# Patient Record
Sex: FEMALE | Race: WHITE | Employment: OTHER | URBAN - METROPOLITAN AREA
[De-identification: names, ages, dates, MRNs, and addresses within clinical notes are randomized per-mention and may not be internally consistent; named-entity substitution may affect disease eponyms.]

---

## 2017-01-05 ENCOUNTER — OFFICE VISIT (OUTPATIENT)
Dept: INTERNAL MEDICINE CLINIC | Facility: CLINIC | Age: 67
End: 2017-01-05

## 2017-01-05 ENCOUNTER — APPOINTMENT (OUTPATIENT)
Dept: LAB | Age: 67
End: 2017-01-05
Attending: INTERNAL MEDICINE
Payer: MEDICARE

## 2017-01-05 VITALS
DIASTOLIC BLOOD PRESSURE: 66 MMHG | RESPIRATION RATE: 16 BRPM | HEART RATE: 84 BPM | TEMPERATURE: 98 F | WEIGHT: 153 LBS | SYSTOLIC BLOOD PRESSURE: 120 MMHG | HEIGHT: 63.5 IN | BODY MASS INDEX: 26.77 KG/M2

## 2017-01-05 DIAGNOSIS — R79.89 ABNORMAL THYROID STIMULATING HORMONE (TSH) LEVEL: ICD-10-CM

## 2017-01-05 DIAGNOSIS — Z85.850 HISTORY OF THYROID CANCER: ICD-10-CM

## 2017-01-05 DIAGNOSIS — I10 ESSENTIAL HYPERTENSION: ICD-10-CM

## 2017-01-05 DIAGNOSIS — L57.0 ACTINIC KERATOSIS: ICD-10-CM

## 2017-01-05 DIAGNOSIS — Z00.01 ENCOUNTER FOR GENERAL ADULT MEDICAL EXAMINATION WITH ABNORMAL FINDINGS: Primary | ICD-10-CM

## 2017-01-05 DIAGNOSIS — E78.00 HYPERCHOLESTEROLEMIA: ICD-10-CM

## 2017-01-05 LAB — TSI SER-ACNC: 4.94 MIU/ML (ref 0.35–5.5)

## 2017-01-05 PROCEDURE — 36415 COLL VENOUS BLD VENIPUNCTURE: CPT

## 2017-01-05 PROCEDURE — G0438 PPPS, INITIAL VISIT: HCPCS | Performed by: INTERNAL MEDICINE

## 2017-01-05 PROCEDURE — 84443 ASSAY THYROID STIM HORMONE: CPT

## 2017-01-05 RX ORDER — FLUOROURACIL 50 MG/G
CREAM TOPICAL
Refills: 0 | COMMUNITY
Start: 2016-11-01 | End: 2017-11-06

## 2017-01-05 NOTE — PROGRESS NOTES
Okay name: Hardeep Bazan  /Sex/Age: [id number] 74year old female  Enc.  Date: 2017     Visit Information:  CC: Hardeep Bazan is here for the [facility] Wellness Exam.  We are happy to be caring for you,Nnacy,  and are ready to support your efforts to optim LDL 80 12/21/2016   VLDL 17 07/22/2016   TCHDLRATIO 2.71 07/22/2016   [place] 113 07/22/2016       Patient Active Problem List:     Actinic keratosis     Hypertension     Hypercholesterolemia     Dry eyes     History of thyroid cancer     Pattern dystrop Hives    OB/GYN History:  No LMP recorded.  Patient is postmenopausal.      Family History:  Family History   Problem Relation Age of Onset   • Cancer Father      melanoma  at 80   • Heart Disorder Father      CABG in 52's   • Heart Disorder Mother health state?: Good    How do you maintain positive mental well-being?: Social Interaction; Visiting Family    If you are a male age 38-65 or a female age 47-67, do you take aspirin?: Yes    Have you had any immunizations at another office such as Influenza What day of the week is this?: Correct    What month is it?: Correct    What year is it?: Correct    Recall \"Ball\": Correct    Recall \"Flag\": Correct    Recall \"Tree\": Correct      Review of Systems:     Constitutional: Negative for fever, chills a intact. No motor weakness, sensory loss. Gait normal.  Reflexes normal.   Psych: No anxiety, depression, fluctuating moods.     [facility] Labs and Tests:  EKG: Normal sinus rhythm  Hearing Test: Normal except decreased hearing in high frequencies at 25 dB but n 19.0   ----------------------------------------------------------  Small LDL-P and LDL Size are associated with CVD risk, but not   after  LDL-P is taken into account. [address]   HDL Particle Number Date: 12/21/2016   Value: 40.0  Ref Range >=30.5 umol/L Status: Final    Comment: Performed at: [facility], [address] [place] [address] Insulin Res .  The LP-IR score is a laboratory developed index that has been  associated with insulin resistance and diabetes risk and should be  used as one component of a physician's clinical assessment.  Neither  the LP-IR score nor the Ref Range 3.5-4.8 g/dL Status: Final   Sodium Date: 12/21/2016   Value: 137  Ref Range 136-144 mmol/L Status: Final   Potassium Date: 12/21/2016   Value: 4.0  Ref Range 3.6-5.1 mmol/L Status: Final   Chloride Date: 12/21/2016   Value: 101  Ref Range 101-11 Date: 12/21/2016   Value: 33.8  Ref Range 31.0-37.0 g/dL Status: Final   RDW Date: 12/21/2016   Value: 12.0  Ref Range 11.5-16.0 % Status: Final   RDW-SD Date: 12/21/2016   Value: 40.2  Ref Range 35.1-46.3 fL Status: Final   Neutrophil Absolute Prelim Date Enalapril    Health Maintenance and Patient Instructions:   Jenny Zelayak, the healthiest diet is a plant-based, vegetarian diet. It will prevent diabetes, heart disease, osteoporosis and other degenerative diseases such as arthritis.  It consists of vegetables, frui

## 2017-01-07 ENCOUNTER — HOSPITAL ENCOUNTER (OUTPATIENT)
Dept: MAMMOGRAPHY | Age: 67
Discharge: HOME | End: 2017-01-07
Attending: INTERNAL MEDICINE
Payer: MEDICARE

## 2017-01-07 DIAGNOSIS — Z12.31 ENCOUNTER FOR MAMMOGRAM TO ESTABLISH BASELINE MAMMOGRAM: ICD-10-CM

## 2017-01-07 PROCEDURE — 77063 BREAST TOMOSYNTHESIS BI: CPT

## 2017-01-07 PROCEDURE — 77067 SCR MAMMO BI INCL CAD: CPT

## 2017-01-24 ENCOUNTER — OFFICE VISIT (OUTPATIENT)
Dept: INTERNAL MEDICINE CLINIC | Facility: CLINIC | Age: 67
End: 2017-01-24

## 2017-01-24 VITALS
BODY MASS INDEX: 26.77 KG/M2 | HEIGHT: 63.5 IN | DIASTOLIC BLOOD PRESSURE: 80 MMHG | SYSTOLIC BLOOD PRESSURE: 124 MMHG | TEMPERATURE: 98 F | WEIGHT: 153 LBS | RESPIRATION RATE: 16 BRPM | HEART RATE: 84 BPM

## 2017-01-24 DIAGNOSIS — H92.01 RIGHT EAR PAIN: Primary | ICD-10-CM

## 2017-01-24 PROCEDURE — 99212 OFFICE O/P EST SF 10 MIN: CPT | Performed by: INTERNAL MEDICINE

## 2017-01-24 NOTE — PROGRESS NOTES
Patient presents with:  Ear Pain: R ear pain x 1 day, flying tomorrow      HPI: Lorre Gowers is here to have her right ear checked before flying to [facility] tomorrow. She had a pressure type pain in it twice in the last day. No URI sx, no tooth or tongue pain.  Took metastatic melanoma          Physical Exam:   /80 mmHg  Pulse 84  Temp(Src) 98.1 °F (36.7 °C) (Oral)  Resp 16  Ht 63.5\"  Wt 153 lb  BMI 26.67 kg/m2  Alert, in no distress  HEENT: R TM pink without serous otitis, inflammation; L TM pink, without sero

## 2017-05-30 ENCOUNTER — OFFICE VISIT (OUTPATIENT)
Dept: INTERNAL MEDICINE CLINIC | Facility: CLINIC | Age: 67
End: 2017-05-30

## 2017-05-30 VITALS
RESPIRATION RATE: 16 BRPM | HEART RATE: 108 BPM | HEIGHT: 63 IN | SYSTOLIC BLOOD PRESSURE: 118 MMHG | BODY MASS INDEX: 27.46 KG/M2 | TEMPERATURE: 98 F | DIASTOLIC BLOOD PRESSURE: 76 MMHG | WEIGHT: 155 LBS

## 2017-05-30 DIAGNOSIS — I10 ESSENTIAL HYPERTENSION: ICD-10-CM

## 2017-05-30 DIAGNOSIS — Z85.850 HISTORY OF THYROID CANCER: ICD-10-CM

## 2017-05-30 DIAGNOSIS — E78.00 HYPERCHOLESTEROLEMIA: ICD-10-CM

## 2017-05-30 DIAGNOSIS — R73.02 GLUCOSE INTOLERANCE (IMPAIRED GLUCOSE TOLERANCE): Primary | ICD-10-CM

## 2017-05-30 DIAGNOSIS — E03.8 OTHER SPECIFIED HYPOTHYROIDISM: ICD-10-CM

## 2017-05-30 DIAGNOSIS — Z78.0 POST-MENOPAUSAL: ICD-10-CM

## 2017-05-30 PROBLEM — E03.9 HYPOTHYROIDISM: Status: ACTIVE | Noted: 2017-05-30

## 2017-05-30 PROCEDURE — 99214 OFFICE O/P EST MOD 30 MIN: CPT | Performed by: INTERNAL MEDICINE

## 2017-05-30 RX ORDER — SIMVASTATIN 10 MG
TABLET ORAL
Qty: 90 TABLET | Refills: 1 | Status: SHIPPED | OUTPATIENT
Start: 2017-05-30 | End: 2018-01-17

## 2017-05-30 RX ORDER — ENALAPRIL MALEATE 20 MG/1
TABLET ORAL
Qty: 90 TABLET | Refills: 1 | Status: SHIPPED | OUTPATIENT
Start: 2017-05-30 | End: 2018-01-17

## 2017-05-30 NOTE — PROGRESS NOTES
Joby Simon is a 74year old female.   Patient presents with:  Hypertension  High Cholesterol  Overweight  Medication Follow-Up      HPI:     Patient here for establishing care and going over her chronic problems, was with Dr. Seth Dakin for 30+ years  H/o thyr History   Diagnosis Date   • Herpes zoster    • High blood pressure    • High cholesterol    • Unspecified disorder of thyroid       Social History:    Smoking Status: Never Smoker                      Smokeless Status: Never Used                        Al rpt labs now  Post-menopausal- last dexa 2014, no h/o fractures.  Discussed adequate calcium and vit d, check dexa this year  History of thyroid cancer- s/p surgery, on levothyroxine that is managed by Endo, recently increased to 137mcg daily, pt would like

## 2017-09-05 ENCOUNTER — LAB ENCOUNTER (OUTPATIENT)
Dept: LAB | Age: 67
End: 2017-09-05
Attending: INTERNAL MEDICINE
Payer: MEDICARE

## 2017-09-05 DIAGNOSIS — R73.02 GLUCOSE INTOLERANCE (IMPAIRED GLUCOSE TOLERANCE): ICD-10-CM

## 2017-09-05 DIAGNOSIS — E03.8 OTHER SPECIFIED HYPOTHYROIDISM: ICD-10-CM

## 2017-09-05 DIAGNOSIS — I10 ESSENTIAL HYPERTENSION: ICD-10-CM

## 2017-09-05 DIAGNOSIS — E78.00 HYPERCHOLESTEROLEMIA: ICD-10-CM

## 2017-09-05 DIAGNOSIS — Z85.850 HISTORY OF THYROID CANCER: ICD-10-CM

## 2017-09-05 LAB
ALBUMIN SERPL-MCNC: 3.7 G/DL (ref 3.5–4.8)
ALP LIVER SERPL-CCNC: 52 U/L (ref 55–142)
ALT SERPL-CCNC: 41 U/L (ref 14–54)
AST SERPL-CCNC: 25 U/L (ref 15–41)
BILIRUB SERPL-MCNC: 0.6 MG/DL (ref 0.1–2)
BUN BLD-MCNC: 17 MG/DL (ref 8–20)
CALCIUM BLD-MCNC: 9.2 MG/DL (ref 8.3–10.3)
CHLORIDE: 103 MMOL/L (ref 101–111)
CHOLEST SMN-MCNC: 123 MG/DL (ref ?–200)
CO2: 28 MMOL/L (ref 22–32)
CREAT BLD-MCNC: 0.91 MG/DL (ref 0.55–1.02)
EST. AVERAGE GLUCOSE BLD GHB EST-MCNC: 123 MG/DL (ref 68–126)
FREE T4: 1.6 NG/DL (ref 0.9–1.8)
GLUCOSE BLD-MCNC: 88 MG/DL (ref 70–99)
HBA1C MFR BLD HPLC: 5.9 % (ref ?–5.7)
HDLC SERPL-MCNC: 63 MG/DL (ref 45–?)
HDLC SERPL: 1.95 {RATIO} (ref ?–4.44)
LDLC SERPL CALC-MCNC: 46 MG/DL (ref ?–130)
LDLC SERPL-MCNC: 14 MG/DL (ref 5–40)
M PROTEIN MFR SERPL ELPH: 7 G/DL (ref 6.1–8.3)
NONHDLC SERPL-MCNC: 60 MG/DL (ref ?–130)
POTASSIUM SERPL-SCNC: 4.7 MMOL/L (ref 3.6–5.1)
SODIUM SERPL-SCNC: 141 MMOL/L (ref 136–144)
TRIGLYCERIDES: 71 MG/DL (ref ?–150)
TSI SER-ACNC: 0.41 MIU/ML (ref 0.35–5.5)

## 2017-09-05 PROCEDURE — 80053 COMPREHEN METABOLIC PANEL: CPT

## 2017-09-05 PROCEDURE — 83036 HEMOGLOBIN GLYCOSYLATED A1C: CPT

## 2017-09-05 PROCEDURE — 84443 ASSAY THYROID STIM HORMONE: CPT

## 2017-09-05 PROCEDURE — 84439 ASSAY OF FREE THYROXINE: CPT

## 2017-09-05 PROCEDURE — 80061 LIPID PANEL: CPT

## 2017-09-19 ENCOUNTER — HOSPITAL ENCOUNTER (OUTPATIENT)
Dept: BONE DENSITY | Age: 67
Discharge: HOME | End: 2017-09-19
Attending: INTERNAL MEDICINE
Payer: MEDICARE

## 2017-09-19 DIAGNOSIS — Z78.0 POST-MENOPAUSAL: ICD-10-CM

## 2017-09-19 PROCEDURE — 77080 DXA BONE DENSITY AXIAL: CPT | Performed by: INTERNAL MEDICINE

## 2017-09-26 ENCOUNTER — OFFICE VISIT (OUTPATIENT)
Dept: INTERNAL MEDICINE CLINIC | Facility: CLINIC | Age: 67
End: 2017-09-26

## 2017-09-26 ENCOUNTER — TELEPHONE (OUTPATIENT)
Dept: INTERNAL MEDICINE CLINIC | Facility: CLINIC | Age: 67
End: 2017-09-26

## 2017-09-26 VITALS
BODY MASS INDEX: 25.87 KG/M2 | SYSTOLIC BLOOD PRESSURE: 128 MMHG | RESPIRATION RATE: 16 BRPM | HEART RATE: 99 BPM | HEIGHT: 63 IN | DIASTOLIC BLOOD PRESSURE: 70 MMHG | TEMPERATURE: 99 F | WEIGHT: 146 LBS

## 2017-09-26 DIAGNOSIS — Z85.850 HISTORY OF THYROID CANCER: ICD-10-CM

## 2017-09-26 DIAGNOSIS — E03.8 OTHER SPECIFIED HYPOTHYROIDISM: ICD-10-CM

## 2017-09-26 DIAGNOSIS — J06.9 VIRAL URI: ICD-10-CM

## 2017-09-26 DIAGNOSIS — Z23 NEED FOR PNEUMOCOCCAL VACCINATION: ICD-10-CM

## 2017-09-26 DIAGNOSIS — E78.00 HYPERCHOLESTEROLEMIA: ICD-10-CM

## 2017-09-26 DIAGNOSIS — R73.02 GLUCOSE INTOLERANCE (IMPAIRED GLUCOSE TOLERANCE): Primary | ICD-10-CM

## 2017-09-26 DIAGNOSIS — Z12.11 ENCOUNTER FOR SCREENING COLONOSCOPY: ICD-10-CM

## 2017-09-26 DIAGNOSIS — I10 ESSENTIAL HYPERTENSION: ICD-10-CM

## 2017-09-26 PROCEDURE — 90732 PPSV23 VACC 2 YRS+ SUBQ/IM: CPT | Performed by: INTERNAL MEDICINE

## 2017-09-26 PROCEDURE — G0009 ADMIN PNEUMOCOCCAL VACCINE: HCPCS | Performed by: INTERNAL MEDICINE

## 2017-09-26 PROCEDURE — 99214 OFFICE O/P EST MOD 30 MIN: CPT | Performed by: INTERNAL MEDICINE

## 2017-09-26 NOTE — PROGRESS NOTES
Perri Hartman is a 69year old female.   Patient presents with:  Abnormal Result (metabolic, cardiac)  Thyroid Problem  Cholesterol  Vaccinations  URI      HPI:     Patient here for f/u-  Glucose intolerance- hgba1c went up slightly, pt watching diet, weigh TABS 1 TABLET DAILY Disp:  Rfl:    FISH OIL 3 capsules daily Disp:  Rfl:       Past Medical History:   Diagnosis Date   • Herpes zoster    • High blood pressure    • High cholesterol    • Unspecified disorder of thyroid       Social History:  Smoking statu tolerance) - continue healthy diet and regular exercise, check hgba1c in 3 months  Essential hypertension - controlled, cpm  Hypercholesterolemia- controlled, cpm  Need for pneumococcal vaccination- pneumovax given today  History of thyroid cancer- f/u End

## 2017-11-06 ENCOUNTER — OFFICE VISIT (OUTPATIENT)
Dept: INTERNAL MEDICINE CLINIC | Facility: CLINIC | Age: 67
End: 2017-11-06

## 2017-11-06 VITALS
DIASTOLIC BLOOD PRESSURE: 68 MMHG | WEIGHT: 138 LBS | BODY MASS INDEX: 24.45 KG/M2 | HEART RATE: 85 BPM | OXYGEN SATURATION: 99 % | RESPIRATION RATE: 17 BRPM | SYSTOLIC BLOOD PRESSURE: 120 MMHG | HEIGHT: 63 IN

## 2017-11-06 DIAGNOSIS — H92.02 LEFT EAR PAIN: Primary | ICD-10-CM

## 2017-11-06 DIAGNOSIS — J06.9 ACUTE URI: ICD-10-CM

## 2017-11-06 PROCEDURE — 99213 OFFICE O/P EST LOW 20 MIN: CPT | Performed by: NURSE PRACTITIONER

## 2017-11-06 PROCEDURE — 87880 STREP A ASSAY W/OPTIC: CPT | Performed by: NURSE PRACTITIONER

## 2017-11-06 RX ORDER — CODEINE PHOSPHATE AND GUAIFENESIN 10; 100 MG/5ML; MG/5ML
5 SOLUTION ORAL NIGHTLY PRN
Qty: 118 ML | Refills: 0 | Status: SHIPPED | OUTPATIENT
Start: 2017-11-06 | End: 2017-11-14

## 2017-11-06 RX ORDER — CEFUROXIME AXETIL 500 MG/1
500 TABLET ORAL 2 TIMES DAILY
Qty: 14 TABLET | Refills: 0 | Status: SHIPPED | OUTPATIENT
Start: 2017-11-06 | End: 2017-11-14

## 2017-11-06 NOTE — PATIENT INSTRUCTIONS
Gargle with warm salt water solution 3-5 times daily. Dissolve 1/2 teaspoon salt in half cup of warm tap water. Gargle and spit.      Try a premixed saline nasal spray, available over the counter, such as Ocean Nasal Spray, 4 times daily (

## 2017-11-06 NOTE — PROGRESS NOTES
Patient presents with:  Ear Pain: left side , has been taking advil every 4 hrs   Sore Throat: has been taking certirizine and pseudoephedrine since sat       HPI:  Presents with approx 4-5 day history of left ear pain, sinus congestion, sore throat, cough FIBERCON OR QD Disp:  Rfl:    MULTI-VITAMIN/MINERALS OR TABS 1 TABLET DAILY Disp:  Rfl:    FISH OIL 3 capsules daily Disp:  Rfl:        Physical Exam  /68   Pulse 85   Resp 17   Ht 63\"   Wt 138 lb   SpO2 99%   BMI 24.45 kg/m²   Constitutional:  No  Also, may use Ocean Nasal spray during the day while at work, school, in between full sinus rinses. Still try to do full nasal wash at least 3 times daily (morning before work, after work and before bed). Take all antibiotics as prescribed.  Do not sto

## 2017-11-14 ENCOUNTER — OFFICE VISIT (OUTPATIENT)
Dept: INTERNAL MEDICINE CLINIC | Facility: CLINIC | Age: 67
End: 2017-11-14

## 2017-11-14 VITALS
DIASTOLIC BLOOD PRESSURE: 72 MMHG | BODY MASS INDEX: 25 KG/M2 | SYSTOLIC BLOOD PRESSURE: 120 MMHG | TEMPERATURE: 99 F | HEART RATE: 60 BPM | WEIGHT: 143 LBS | RESPIRATION RATE: 16 BRPM

## 2017-11-14 DIAGNOSIS — H91.92 HEARING LOSS OF LEFT EAR, UNSPECIFIED HEARING LOSS TYPE: Primary | ICD-10-CM

## 2017-11-14 PROCEDURE — 99213 OFFICE O/P EST LOW 20 MIN: CPT | Performed by: NURSE PRACTITIONER

## 2017-11-14 RX ORDER — LEVOTHYROXINE SODIUM 137 UG/1
137 TABLET ORAL
COMMUNITY
End: 2019-08-12

## 2017-11-14 NOTE — PROGRESS NOTES
Patient presents with: Follow - Up: left ear pain, voice is not back per patient thinks it might have gone to right ear, still coughing up stuff ROOM 1       HPI:  Presents for follow up of recent otitis media with left ear pain and hearing loss.  Elena tan Rfl:    FISH OIL 3 capsules daily Disp:  Rfl:        Physical Exam  /72 (BP Location: Right arm, Patient Position: Sitting, Cuff Size: adult)   Pulse 60   Temp 98.8 °F (37.1 °C) (Oral)   Resp 16   Wt 143 lb   BMI 25.33 kg/m²   Constitutional:  No dis

## 2017-12-12 ENCOUNTER — TELEPHONE (OUTPATIENT)
Dept: INTERNAL MEDICINE CLINIC | Facility: CLINIC | Age: 67
End: 2017-12-12

## 2017-12-12 DIAGNOSIS — Z12.39 SCREENING FOR BREAST CANCER: Primary | ICD-10-CM

## 2017-12-12 NOTE — TELEPHONE ENCOUNTER
Please advise : [facility] RADHA 2D+3D SCRN BETH W/CAD BILAT  Or DIGITAL SCREENING BETH W/ CAD    Notes Recorded by Jeramy Henson MD on 1/9/2017 at 1:00 PM   Phil Messina, your mammogram is normal.

## 2017-12-20 ENCOUNTER — HOSPITAL ENCOUNTER (OUTPATIENT)
Dept: CT IMAGING | Facility: HOSPITAL | Age: 67
Discharge: HOME | End: 2017-12-20
Attending: INTERNAL MEDICINE

## 2017-12-20 DIAGNOSIS — Z13.6 SCREENING FOR HEART DISEASE: ICD-10-CM

## 2017-12-24 ENCOUNTER — TELEPHONE (OUTPATIENT)
Dept: INTERNAL MEDICINE CLINIC | Facility: CLINIC | Age: 67
End: 2017-12-24

## 2017-12-24 DIAGNOSIS — R93.1 ABNORMAL SCREENING CARDIAC CT: ICD-10-CM

## 2017-12-24 DIAGNOSIS — E78.5 DYSLIPIDEMIA: Primary | ICD-10-CM

## 2017-12-29 ENCOUNTER — OFFICE VISIT (OUTPATIENT)
Dept: FAMILY MEDICINE CLINIC | Facility: CLINIC | Age: 67
End: 2017-12-29

## 2017-12-29 VITALS
RESPIRATION RATE: 18 BRPM | WEIGHT: 143 LBS | TEMPERATURE: 98 F | DIASTOLIC BLOOD PRESSURE: 82 MMHG | SYSTOLIC BLOOD PRESSURE: 138 MMHG | BODY MASS INDEX: 25 KG/M2 | OXYGEN SATURATION: 100 % | HEART RATE: 80 BPM

## 2017-12-29 DIAGNOSIS — J20.9 BRONCHITIS WITH BRONCHOSPASM: Primary | ICD-10-CM

## 2017-12-29 PROCEDURE — 99213 OFFICE O/P EST LOW 20 MIN: CPT | Performed by: NURSE PRACTITIONER

## 2017-12-29 RX ORDER — ALBUTEROL SULFATE 90 UG/1
AEROSOL, METERED RESPIRATORY (INHALATION)
Qty: 1 INHALER | Refills: 0 | Status: SHIPPED | OUTPATIENT
Start: 2017-12-29 | End: 2018-01-30 | Stop reason: ALTCHOICE

## 2017-12-29 RX ORDER — METHYLPREDNISOLONE 4 MG/1
TABLET ORAL
Qty: 1 KIT | Refills: 0 | Status: SHIPPED | OUTPATIENT
Start: 2017-12-29 | End: 2018-01-30 | Stop reason: ALTCHOICE

## 2017-12-29 NOTE — PROGRESS NOTES
CHIEF COMPLAINT:   Patient presents with:  Cough: hears wheeze for 3 days        HPI:   Silke Mitchell is a 69year old female who presents for cough for  3  days. Cough started gradually and is described as tight and deep.  Patient denies history of bronch Diagnosis Date   • Herpes zoster    • High blood pressure    • High cholesterol    • Unspecified disorder of thyroid       Social History:  Smoking status: Never Smoker                                                              Smokeless tobacco: Never U Kavita Stokes is a 69year old female who presents with: Wheezing and bronchospasm during cough. Wheeze does not clear with cough, denies any chills or temperature elevation,is currently under ENTs' care for left TM rupture.  Will treat bronchitis and bronc Lungs with bronchitis  Bronchitis often occurs with a cold or the flu virus. The airways become inflamed (red and swollen). There is a deep hacking cough from the extra mucus.  Other symptoms may include:  · Wheezing  · Chest discomfort  · Shortness of bonifacio · Drink a lot of water and juice. They can soothe the throat and may help thin mucus. · Sit up or use extra pillows when in bed. This helps to lessen coughing and congestion. · Ask your provider about using medicine.  Ask about using cough medicine, pain Call your healthcare provider if:  · Symptoms worsen, or you have new symptoms  · Breathing problems worsen or  become severe  · Symptoms don’t get better within a week, or within 3 days of taking antibiotics   Date Last Reviewed: 2/1/2017 © 2000-2017 The

## 2017-12-29 NOTE — PATIENT INSTRUCTIONS
What Is Acute Bronchitis? Acute bronchitis is when the airways in your lungs (bronchial tubes) become red and swollen (inflamed). It is usually caused by a viral infection. But it can also occur because of a bacteria or allergen.  Symptoms include a coug · A chest X-ray. This is done if your healthcare provider thinks you have pneumonia. · Tests to check for an underlying condition. Other tests may be done to check for things such as allergies, asthma, or COPD (chronic obstructive pulmonary disease).  You · Take the medicines as directed. For instance, some medicines should be taken with food. · Ask about side effects. Ask your provider or pharmacist what side effects are common, and what to do about them.   Follow-up care  You should see your provider joy

## 2018-01-05 ENCOUNTER — TELEPHONE (OUTPATIENT)
Dept: INTERNAL MEDICINE CLINIC | Facility: CLINIC | Age: 68
End: 2018-01-05

## 2018-01-09 ENCOUNTER — HOSPITAL ENCOUNTER (OUTPATIENT)
Dept: MAMMOGRAPHY | Age: 68
Discharge: HOME | End: 2018-01-09
Attending: NURSE PRACTITIONER
Payer: MEDICARE

## 2018-01-09 DIAGNOSIS — Z12.39 SCREENING FOR BREAST CANCER: ICD-10-CM

## 2018-01-09 PROCEDURE — 77067 SCR MAMMO BI INCL CAD: CPT | Performed by: NURSE PRACTITIONER

## 2018-01-09 PROCEDURE — 77063 BREAST TOMOSYNTHESIS BI: CPT | Performed by: NURSE PRACTITIONER

## 2018-01-10 ENCOUNTER — HOSPITAL ENCOUNTER (OUTPATIENT)
Dept: CARDIOLOGY CLINIC | Facility: HOSPITAL | Age: 68
Discharge: HOME | End: 2018-01-10
Attending: INTERNAL MEDICINE

## 2018-01-10 DIAGNOSIS — Z13.9 SPECIAL SCREENING: ICD-10-CM

## 2018-01-17 RX ORDER — SIMVASTATIN 10 MG
TABLET ORAL
Qty: 90 TABLET | Refills: 0 | Status: SHIPPED | OUTPATIENT
Start: 2018-01-17 | End: 2018-01-30

## 2018-01-17 RX ORDER — ENALAPRIL MALEATE 20 MG/1
TABLET ORAL
Qty: 90 TABLET | Refills: 0 | Status: SHIPPED | OUTPATIENT
Start: 2018-01-17 | End: 2018-01-30

## 2018-01-22 ENCOUNTER — LAB ENCOUNTER (OUTPATIENT)
Dept: LAB | Age: 68
End: 2018-01-22
Attending: INTERNAL MEDICINE
Payer: MEDICARE

## 2018-01-22 DIAGNOSIS — E03.8 OTHER SPECIFIED HYPOTHYROIDISM: ICD-10-CM

## 2018-01-22 DIAGNOSIS — E78.00 HYPERCHOLESTEROLEMIA: ICD-10-CM

## 2018-01-22 DIAGNOSIS — R73.02 GLUCOSE INTOLERANCE (IMPAIRED GLUCOSE TOLERANCE): ICD-10-CM

## 2018-01-22 DIAGNOSIS — I10 ESSENTIAL HYPERTENSION: ICD-10-CM

## 2018-01-22 LAB
ALBUMIN SERPL-MCNC: 3.8 G/DL (ref 3.5–4.8)
ALP LIVER SERPL-CCNC: 58 U/L (ref 55–142)
ALT SERPL-CCNC: 45 U/L (ref 14–54)
AST SERPL-CCNC: 38 U/L (ref 15–41)
BASOPHILS # BLD AUTO: 0.07 X10(3) UL (ref 0–0.1)
BASOPHILS NFR BLD AUTO: 0.7 %
BILIRUB SERPL-MCNC: 0.6 MG/DL (ref 0.1–2)
BUN BLD-MCNC: 21 MG/DL (ref 8–20)
CALCIUM BLD-MCNC: 8.5 MG/DL (ref 8.3–10.3)
CHLORIDE: 106 MMOL/L (ref 101–111)
CHOLEST SMN-MCNC: 171 MG/DL (ref ?–200)
CO2: 28 MMOL/L (ref 22–32)
CREAT BLD-MCNC: 0.93 MG/DL (ref 0.55–1.02)
EOSINOPHIL # BLD AUTO: 0.09 X10(3) UL (ref 0–0.3)
EOSINOPHIL NFR BLD AUTO: 0.9 %
ERYTHROCYTE [DISTWIDTH] IN BLOOD BY AUTOMATED COUNT: 12.3 % (ref 11.5–16)
EST. AVERAGE GLUCOSE BLD GHB EST-MCNC: 123 MG/DL (ref 68–126)
FREE T4: 1.2 NG/DL (ref 0.9–1.8)
GLUCOSE BLD-MCNC: 88 MG/DL (ref 70–99)
HBA1C MFR BLD HPLC: 5.9 % (ref ?–5.7)
HCT VFR BLD AUTO: 47.1 % (ref 34–50)
HDLC SERPL-MCNC: 65 MG/DL (ref 45–?)
HDLC SERPL: 2.63 {RATIO} (ref ?–4.44)
HGB BLD-MCNC: 15 G/DL (ref 12–16)
IMMATURE GRANULOCYTE COUNT: 0.03 X10(3) UL (ref 0–1)
IMMATURE GRANULOCYTE RATIO %: 0.3 %
LDLC SERPL CALC-MCNC: 87 MG/DL (ref ?–130)
LYMPHOCYTES # BLD AUTO: 2.15 X10(3) UL (ref 0.9–4)
LYMPHOCYTES NFR BLD AUTO: 22.7 %
M PROTEIN MFR SERPL ELPH: 7.5 G/DL (ref 6.1–8.3)
MCH RBC QN AUTO: 29.5 PG (ref 27–33.2)
MCHC RBC AUTO-ENTMCNC: 31.8 G/DL (ref 31–37)
MCV RBC AUTO: 92.7 FL (ref 81–100)
MONOCYTES # BLD AUTO: 0.52 X10(3) UL (ref 0.1–0.6)
MONOCYTES NFR BLD AUTO: 5.5 %
NEUTROPHIL ABS PRELIM: 6.63 X10 (3) UL (ref 1.3–6.7)
NEUTROPHILS # BLD AUTO: 6.63 X10(3) UL (ref 1.3–6.7)
NEUTROPHILS NFR BLD AUTO: 69.9 %
NONHDLC SERPL-MCNC: 106 MG/DL (ref ?–130)
PLATELET # BLD AUTO: 264 10(3)UL (ref 150–450)
POTASSIUM SERPL-SCNC: 4.5 MMOL/L (ref 3.6–5.1)
RBC # BLD AUTO: 5.08 X10(6)UL (ref 3.8–5.1)
RED CELL DISTRIBUTION WIDTH-SD: 42.2 FL (ref 35.1–46.3)
SODIUM SERPL-SCNC: 140 MMOL/L (ref 136–144)
TRIGL SERPL-MCNC: 96 MG/DL (ref ?–150)
TSI SER-ACNC: 3.42 MIU/ML (ref 0.35–5.5)
VLDLC SERPL CALC-MCNC: 19 MG/DL (ref 5–40)
WBC # BLD AUTO: 9.5 X10(3) UL (ref 4–13)

## 2018-01-22 PROCEDURE — 84443 ASSAY THYROID STIM HORMONE: CPT

## 2018-01-22 PROCEDURE — 83036 HEMOGLOBIN GLYCOSYLATED A1C: CPT

## 2018-01-22 PROCEDURE — 84439 ASSAY OF FREE THYROXINE: CPT

## 2018-01-22 PROCEDURE — 80053 COMPREHEN METABOLIC PANEL: CPT

## 2018-01-22 PROCEDURE — 85025 COMPLETE CBC W/AUTO DIFF WBC: CPT

## 2018-01-22 PROCEDURE — 80061 LIPID PANEL: CPT

## 2018-01-24 RX ORDER — SIMVASTATIN 10 MG
TABLET ORAL
Qty: 90 TABLET | Refills: 0 | Status: SHIPPED | OUTPATIENT
Start: 2018-01-24 | End: 2018-01-30

## 2018-01-30 ENCOUNTER — OFFICE VISIT (OUTPATIENT)
Dept: INTERNAL MEDICINE CLINIC | Facility: CLINIC | Age: 68
End: 2018-01-30

## 2018-01-30 VITALS
TEMPERATURE: 98 F | DIASTOLIC BLOOD PRESSURE: 78 MMHG | HEIGHT: 63 IN | RESPIRATION RATE: 16 BRPM | HEART RATE: 84 BPM | BODY MASS INDEX: 24.98 KG/M2 | SYSTOLIC BLOOD PRESSURE: 126 MMHG | WEIGHT: 141 LBS

## 2018-01-30 DIAGNOSIS — H35.54 PATTERN DYSTROPHY OF MACULA: ICD-10-CM

## 2018-01-30 DIAGNOSIS — E74.39 GLUCOSE INTOLERANCE: ICD-10-CM

## 2018-01-30 DIAGNOSIS — Z85.850 HISTORY OF THYROID CANCER: ICD-10-CM

## 2018-01-30 DIAGNOSIS — E78.00 HYPERCHOLESTEROLEMIA: ICD-10-CM

## 2018-01-30 DIAGNOSIS — I10 ESSENTIAL HYPERTENSION: ICD-10-CM

## 2018-01-30 DIAGNOSIS — Z00.00 WELLNESS EXAMINATION: Primary | ICD-10-CM

## 2018-01-30 PROCEDURE — G0439 PPPS, SUBSEQ VISIT: HCPCS | Performed by: INTERNAL MEDICINE

## 2018-01-30 RX ORDER — ENALAPRIL MALEATE 20 MG/1
20 TABLET ORAL
Qty: 90 TABLET | Refills: 3 | Status: SHIPPED | OUTPATIENT
Start: 2018-01-30 | End: 2019-03-11

## 2018-01-30 RX ORDER — SIMVASTATIN 10 MG
10 TABLET ORAL
Qty: 90 TABLET | Refills: 3 | Status: SHIPPED | OUTPATIENT
Start: 2018-01-30 | End: 2019-04-02

## 2018-01-30 NOTE — PROGRESS NOTES
Holly Rooney is a 69year old female who presents for a Medicare Annual Wellness visit.     Left ear finally back to normal, can hear again, s/p complicated otitis media/sinusitis  Glucose intolerance-  pt watching diet and doing the bike, HGBA1c 5.9  HTN/ lb  11/06/17 : 138 lb  09/26/17 : 146 lb  05/30/17 : 155 lb    Body mass index is 24.98 kg/m².       Lab Results  Component Value Date   GLU 88 01/22/2018   GLU 88 09/05/2017   GLU 87 12/21/2016       Lab Results  Component Value Date   CHOLEST 171 01/22/20 without help    Preparing your meals: Able without help    Managing money/bills: Able without help    Taking medications as prescribed: Able without help    Are you able to afford your medications?: Yes    Hearing Problems?: No     Functional Status     He Colorectal Cancer Screening      Colonoscopy Screen every 10 years Colonoscopy,10 Years due on 03/05/2025 Update Health Maintenance if applicable    Flex Sigmoidoscopy Screen every 5 years No results found for this or any previous visit.  No flowsheet lawson No flowsheet data found. Creatinine  Annually Creatinine (mg/dL)   Date Value   01/22/2018 0.93    No flowsheet data found. Digoxin Serum Conc  Annually No results found for: DIGOXIN No flowsheet data found.     Diabetes      HgbA1C  Annually HgbA1C ( ENDOSCOPY  : HYSTERECTOMY      Comment: + oophorectomy  No date:       Comment: 5 children  2000: THYROIDECTOMY   Family History   Problem Relation Age of Onset   • Cancer Father      melanoma  at 80   • Heart Disorder Father      CABG in 52's chest tenderness  BREAST: no masses, no discharge or no axillary lymphadenopathy   LUNGS: clear to auscultation  CARDIO: RRR without murmur  GI: good BS's, no masses, HSM or tenderness  : deferred  RECTAL: deferred  MUSCULOSKELETAL: back is not tender, F

## 2018-04-24 PROBLEM — I25.10 CORONARY ARTERY DISEASE INVOLVING NATIVE CORONARY ARTERY WITHOUT ANGINA PECTORIS: Status: ACTIVE | Noted: 2018-04-24

## 2018-07-24 ENCOUNTER — APPOINTMENT (OUTPATIENT)
Dept: OTHER | Facility: HOSPITAL | Age: 68
End: 2018-07-24
Attending: PREVENTIVE MEDICINE

## 2019-01-29 ENCOUNTER — TELEPHONE (OUTPATIENT)
Dept: INTERNAL MEDICINE CLINIC | Facility: CLINIC | Age: 69
End: 2019-01-29

## 2019-01-29 DIAGNOSIS — Z12.31 ENCOUNTER FOR SCREENING MAMMOGRAM FOR MALIGNANT NEOPLASM OF BREAST: ICD-10-CM

## 2019-01-29 DIAGNOSIS — I10 ESSENTIAL HYPERTENSION: ICD-10-CM

## 2019-01-29 DIAGNOSIS — E03.8 OTHER SPECIFIED HYPOTHYROIDISM: ICD-10-CM

## 2019-01-29 DIAGNOSIS — Z00.00 ROUTINE GENERAL MEDICAL EXAMINATION AT A HEALTH CARE FACILITY: Primary | ICD-10-CM

## 2019-01-29 DIAGNOSIS — E78.00 HYPERCHOLESTEROLEMIA: ICD-10-CM

## 2019-01-29 NOTE — TELEPHONE ENCOUNTER
Future Appointments   Date Time Provider Shane Nieves   4/2/2019 11:00 AM Sherin Mobley MD [facility]       Pt has   And would like order for BW and John.  Pt aware to fast.

## 2019-02-13 ENCOUNTER — PATIENT OUTREACH (OUTPATIENT)
Dept: CASE MANAGEMENT | Age: 69
End: 2019-02-13

## 2019-02-14 ENCOUNTER — TELEPHONE (OUTPATIENT)
Dept: CASE MANAGEMENT | Age: 69
End: 2019-02-14

## 2019-02-14 ENCOUNTER — TELEPHONE (OUTPATIENT)
Dept: INTERNAL MEDICINE CLINIC | Facility: CLINIC | Age: 69
End: 2019-02-14

## 2019-02-14 ENCOUNTER — PATIENT OUTREACH (OUTPATIENT)
Dept: CASE MANAGEMENT | Age: 69
End: 2019-02-14

## 2019-02-14 NOTE — TELEPHONE ENCOUNTER
Notes Recorded by Jacinda Sanchez NP on 1/9/2018 at 1:00 PM [facility] RADHA 2D+3D SCREENING BILAT  Results reviewed. Patient will be notified by radiology. CPE labs ordered per protocol.

## 2019-02-14 NOTE — TELEPHONE ENCOUNTER
Pt enrolled in CCM program today. See TE from 1/29/19-   Pt requesting order for mammogram . Pt states that she would like to complete her mammogram before her upcoming appointment with TB. Thank you!   Future Appointments   Date Time Provider Shahab Rivero

## 2019-02-18 ENCOUNTER — PATIENT OUTREACH (OUTPATIENT)
Dept: CASE MANAGEMENT | Age: 69
End: 2019-02-18

## 2019-02-18 NOTE — PROGRESS NOTES
Called for CCM ASSESSMENT.  Mela Nicholas picked up the phone (ok per hippa) . He states that tomorrow morning would be a better time to call back, pt is not home. Will call tomorrow for CCM ASSESSMENT.

## 2019-02-19 ENCOUNTER — PATIENT OUTREACH (OUTPATIENT)
Dept: CASE MANAGEMENT | Age: 69
End: 2019-02-19

## 2019-03-05 ENCOUNTER — PATIENT OUTREACH (OUTPATIENT)
Dept: CASE MANAGEMENT | Age: 69
End: 2019-03-05

## 2019-03-11 RX ORDER — ENALAPRIL MALEATE 20 MG/1
TABLET ORAL
Qty: 90 TABLET | Refills: 0 | Status: SHIPPED | OUTPATIENT
Start: 2019-03-11 | End: 2019-04-02

## 2019-03-11 NOTE — TELEPHONE ENCOUNTER
Last Office Visit: 1-30-18 with TB for cpe  Last Rx Filled: 1-30-18 90 tabs with 3 refills   Last Labs: 1-22-18 lipid/tsh/t4/cmp/cbc  Future Appointment: 4-2-19    Per protocol to provider

## 2019-03-14 ENCOUNTER — HOSPITAL ENCOUNTER (OUTPATIENT)
Dept: MAMMOGRAPHY | Age: 69
Discharge: HOME | End: 2019-03-14
Attending: INTERNAL MEDICINE
Payer: MEDICARE

## 2019-03-14 DIAGNOSIS — Z00.00 ROUTINE GENERAL MEDICAL EXAMINATION AT A HEALTH CARE FACILITY: ICD-10-CM

## 2019-03-14 DIAGNOSIS — Z12.31 ENCOUNTER FOR SCREENING MAMMOGRAM FOR MALIGNANT NEOPLASM OF BREAST: ICD-10-CM

## 2019-03-14 PROCEDURE — 77067 SCR MAMMO BI INCL CAD: CPT | Performed by: INTERNAL MEDICINE

## 2019-03-14 PROCEDURE — 77063 BREAST TOMOSYNTHESIS BI: CPT | Performed by: INTERNAL MEDICINE

## 2019-03-14 NOTE — MR AVS SNAPSHOT
[facility] [address] [phone]               Thank you for choosing us for your health care visit with Shaun Robert MD.  We are glad to serve you and happy to provide you with this summar Glucose intolerance (impaired glucose tolerance)    -  Primary    Post-menopausal        Other specified hypothyroidism          Instructions and Information about Your Health     None      Allergies as of May 30, 2017     Sulfa Drugs Cross Reactors Hives discharge instructions in [facility] by going to Visits < Admission Summaries. If you've been to the Emergency Department or your doctor's office, you can view your past visit information in [facility] by going to Visits < Visit Summaries. G-volution questions? S Plasty Text: Given the location and shape of the defect, and the orientation of relaxed skin tension lines, an S-plasty was deemed most appropriate for repair.  Using a sterile surgical marker, the appropriate outline of the S-plasty was drawn, incorporating the defect and placing the expected incisions within the relaxed skin tension lines where possible.  The area thus outlined was incised deep to adipose tissue with a #15 scalpel blade.  The skin margins were undermined to an appropriate distance in all directions utilizing iris scissors. The skin flaps were advanced over the defect.  The opposing margins were then approximated with interrupted buried subcutaneous sutures.

## 2019-03-18 ENCOUNTER — PATIENT OUTREACH (OUTPATIENT)
Dept: CASE MANAGEMENT | Age: 69
End: 2019-03-18

## 2019-03-18 NOTE — PROGRESS NOTES
Called and left a detailed message for CCM enroll. I have attempted to contact pt multiple times and unfortunately have been unsuccessful. Letter mailed to patient.    Future Appointments   Date Time Provider Shane Nieves   4/2/2019 11:00 AM Claude Sequin

## 2019-04-02 ENCOUNTER — OFFICE VISIT (OUTPATIENT)
Dept: INTERNAL MEDICINE CLINIC | Facility: CLINIC | Age: 69
End: 2019-04-02
Payer: MEDICARE

## 2019-04-02 ENCOUNTER — LAB ENCOUNTER (OUTPATIENT)
Dept: LAB | Age: 69
End: 2019-04-02
Attending: INTERNAL MEDICINE
Payer: MEDICARE

## 2019-04-02 VITALS
SYSTOLIC BLOOD PRESSURE: 130 MMHG | HEART RATE: 80 BPM | TEMPERATURE: 98 F | WEIGHT: 140 LBS | DIASTOLIC BLOOD PRESSURE: 70 MMHG | RESPIRATION RATE: 16 BRPM | BODY MASS INDEX: 24.8 KG/M2 | HEIGHT: 63 IN

## 2019-04-02 DIAGNOSIS — R73.9 BLOOD GLUCOSE ELEVATED: ICD-10-CM

## 2019-04-02 DIAGNOSIS — Z00.00 ENCOUNTER FOR ANNUAL HEALTH EXAMINATION: Primary | ICD-10-CM

## 2019-04-02 DIAGNOSIS — E55.9 VITAMIN D DEFICIENCY: ICD-10-CM

## 2019-04-02 DIAGNOSIS — I10 ESSENTIAL HYPERTENSION: ICD-10-CM

## 2019-04-02 DIAGNOSIS — E03.8 OTHER SPECIFIED HYPOTHYROIDISM: ICD-10-CM

## 2019-04-02 DIAGNOSIS — E78.00 HYPERCHOLESTEROLEMIA: ICD-10-CM

## 2019-04-02 DIAGNOSIS — M41.80 OTHER FORM OF SCOLIOSIS, UNSPECIFIED SPINAL REGION: ICD-10-CM

## 2019-04-02 DIAGNOSIS — Z85.850 HISTORY OF THYROID CANCER: ICD-10-CM

## 2019-04-02 DIAGNOSIS — Z00.00 ROUTINE GENERAL MEDICAL EXAMINATION AT A HEALTH CARE FACILITY: ICD-10-CM

## 2019-04-02 DIAGNOSIS — J01.00 ACUTE NON-RECURRENT MAXILLARY SINUSITIS: ICD-10-CM

## 2019-04-02 PROCEDURE — 86800 THYROGLOBULIN ANTIBODY: CPT

## 2019-04-02 PROCEDURE — 80061 LIPID PANEL: CPT

## 2019-04-02 PROCEDURE — 84443 ASSAY THYROID STIM HORMONE: CPT

## 2019-04-02 PROCEDURE — 84432 ASSAY OF THYROGLOBULIN: CPT

## 2019-04-02 PROCEDURE — G0439 PPPS, SUBSEQ VISIT: HCPCS | Performed by: INTERNAL MEDICINE

## 2019-04-02 PROCEDURE — 80053 COMPREHEN METABOLIC PANEL: CPT

## 2019-04-02 PROCEDURE — 82306 VITAMIN D 25 HYDROXY: CPT

## 2019-04-02 PROCEDURE — 85025 COMPLETE CBC W/AUTO DIFF WBC: CPT

## 2019-04-02 PROCEDURE — 83036 HEMOGLOBIN GLYCOSYLATED A1C: CPT

## 2019-04-02 PROCEDURE — 84439 ASSAY OF FREE THYROXINE: CPT

## 2019-04-02 RX ORDER — AMOXICILLIN AND CLAVULANATE POTASSIUM 875; 125 MG/1; MG/1
1 TABLET, FILM COATED ORAL 2 TIMES DAILY
Qty: 20 TABLET | Refills: 0 | Status: SHIPPED | OUTPATIENT
Start: 2019-04-02 | End: 2019-04-12

## 2019-04-02 RX ORDER — ENALAPRIL MALEATE 20 MG/1
20 TABLET ORAL
Qty: 90 TABLET | Refills: 3 | Status: SHIPPED | OUTPATIENT
Start: 2019-04-02 | End: 2020-09-05

## 2019-04-02 RX ORDER — SIMVASTATIN 10 MG
10 TABLET ORAL
Qty: 90 TABLET | Refills: 3 | Status: SHIPPED | OUTPATIENT
Start: 2019-04-02 | End: 2019-08-12 | Stop reason: ALTCHOICE

## 2019-04-02 NOTE — PROGRESS NOTES
HPI:   Sangita Teran is a 77year old female who presents for a Medicare Subsequent Annual Wellness visit (Pt already had Initial Annual Wellness).     Patient here for wellness  She feels sick for the last 2 weeks, copious amounts of nasal discharge (yel on her medication list.   CAGE Alcohol screening   Yani Rico was screened for Alcohol abuse and had a score of 0 so is at low risk.     Patient Care Team: Patient Care Team:  Luisa Mitchell MD as PCP - General (Internal Medicine)  Shana Rai NP as 0.05 % Ophthalmic Emulsion 1 Drop 2 (two) times daily. ASPIR-81 81 MG OR TBEC Take  by mouth. 2 daily.     CALCIUM + D OR 2 tablets PO QD   FIBERCON OR QD   MULTI-VITAMIN/MINERALS OR TABS 1 TABLET DAILY   FISH OIL 3 capsules daily      MEDICAL INFORMATION normal   Neck: Supple, symmetrical, trachea midline, no adenopathy;  thyroid: not enlarged, symmetric, no tenderness/mass/nodules; no carotid bruit or JVD   Back:   Symmetric, no curvature, ROM normal, no CVA tenderness   Lungs:   Clear to auscultation cynthia daily.    Hypercholesterolemia - on statin, check labs  -     simvastatin 10 MG Oral Tab; Take 1 tablet (10 mg total) by mouth once daily.     Blood glucose elevated- watch diet, check hgba1c  -     HEMOGLOBIN A1C; Future    Other form of scoliosis, unspeci Occult Blood Annually No results found for: FOB No flowsheet data found. Glaucoma Screening      Ophthalmology Visit Annually: Diabetics, FHx Glaucoma, AA>50, > 65 No flowsheet data found.     Bone Density Screening      Dexascan Every two years Persistent     Medications (ACE/ARB, digoxin diuretics, anticonvulsants.)    Potassium  Annually Potassium (mmol/L)   Date Value   01/22/2018 4.5    No flowsheet data found.     Creatinine  Annually Creatinine (mg/dL)   Date Value   01/22/2018 0.93    No fl

## 2019-04-02 NOTE — PATIENT INSTRUCTIONS
Matthew King's SCREENING SCHEDULE   Tests on this list are recommended by your physician but may not be covered, or covered at this frequency, by your insurer. Please check with your insurance carrier before scheduling to verify coverage.    PREVENTATIVE than 100 cigarettes in their lifetime   • Anyone with a family history    Colorectal Cancer Screening  Covered up to Age 76     Colonoscopy Screen   Covered every 10 years- more often if abnormal Colonoscopy due on 03/05/2025 Update [facility] if a Pneumococcal 13 (Prevnar)  Covered Once after 65 No orders found for this or any previous visit.  Please get once after your 65th birthday    Pneumococcal 23 (Pneumovax)  Covered Once after 65 Orders placed or performed in visit on 09/26/17   • PNEUMOCOCCAL

## 2019-07-11 ENCOUNTER — HOSPITAL ENCOUNTER (OUTPATIENT)
Dept: CV DIAGNOSTICS | Facility: HOSPITAL | Age: 69
Discharge: HOME | End: 2019-07-11
Attending: INTERNAL MEDICINE
Payer: MEDICARE

## 2019-07-11 DIAGNOSIS — E78.2 MIXED HYPERLIPIDEMIA: ICD-10-CM

## 2019-07-11 DIAGNOSIS — I10 HYPERTENSION, ESSENTIAL: ICD-10-CM

## 2019-07-11 DIAGNOSIS — I25.10 ATHEROSCLEROSIS OF NATIVE CORONARY ARTERY OF NATIVE HEART WITHOUT ANGINA PECTORIS: ICD-10-CM

## 2019-07-11 PROCEDURE — 93350 STRESS TTE ONLY: CPT | Performed by: INTERNAL MEDICINE

## 2019-07-11 PROCEDURE — 93018 CV STRESS TEST I&R ONLY: CPT | Performed by: INTERNAL MEDICINE

## 2019-07-11 PROCEDURE — 93017 CV STRESS TEST TRACING ONLY: CPT | Performed by: INTERNAL MEDICINE

## 2020-01-10 ENCOUNTER — TELEPHONE (OUTPATIENT)
Dept: INTERNAL MEDICINE CLINIC | Facility: CLINIC | Age: 70
End: 2020-01-10

## 2020-01-10 DIAGNOSIS — I10 ESSENTIAL HYPERTENSION: ICD-10-CM

## 2020-01-10 DIAGNOSIS — E03.8 OTHER SPECIFIED HYPOTHYROIDISM: ICD-10-CM

## 2020-01-10 DIAGNOSIS — E78.00 HYPERCHOLESTEROLEMIA: ICD-10-CM

## 2020-01-10 DIAGNOSIS — Z00.00 ROUTINE GENERAL MEDICAL EXAMINATION AT A HEALTH CARE FACILITY: Primary | ICD-10-CM

## 2020-01-10 DIAGNOSIS — R73.9 BLOOD GLUCOSE ELEVATED: ICD-10-CM

## 2020-01-10 NOTE — TELEPHONE ENCOUNTER
Future Appointments   Date Time Provider Shane Nieves     Future Appointments   Date Time Provider Shane Nieves   4/7/2020  9:00 AM Golden Kay MD [facility]       Orders to edward-  Pt informed that labs need to be completed no keagan

## 2020-01-29 ENCOUNTER — MED REC SCAN ONLY (OUTPATIENT)
Dept: INTERNAL MEDICINE CLINIC | Facility: CLINIC | Age: 70
End: 2020-01-29

## 2020-02-17 ENCOUNTER — TELEPHONE (OUTPATIENT)
Dept: INTERNAL MEDICINE CLINIC | Facility: CLINIC | Age: 70
End: 2020-02-17

## 2020-02-17 DIAGNOSIS — R92.2 DENSE BREAST: ICD-10-CM

## 2020-02-17 DIAGNOSIS — Z12.31 ENCOUNTER FOR SCREENING MAMMOGRAM FOR MALIGNANT NEOPLASM OF BREAST: Primary | ICD-10-CM

## 2020-02-17 NOTE — TELEPHONE ENCOUNTER
Pt called and would like an order for her annual mammogram placed today     Please send back to  when order is placed so we can call and inform the pt.

## 2020-02-17 NOTE — TELEPHONE ENCOUNTER
Notes recorded by Hyacinth Diehl MD on 3/15/2019 at 12:18 AM CDT  Benign  Repeat in 1 year    Mammo pended for your approval if ok. Please review and advise. Thank you.

## 2020-03-26 NOTE — TELEPHONE ENCOUNTER
Future Appointments   Date Time Provider Shane Nieves   6/23/2020 10:20 AM Clay Garcia MD [facility]     Appointment r/s'd.

## 2020-05-06 ENCOUNTER — HOSPITAL ENCOUNTER (OUTPATIENT)
Dept: MAMMOGRAPHY | Age: 70
Discharge: HOME | End: 2020-05-06
Attending: INTERNAL MEDICINE
Payer: MEDICARE

## 2020-05-06 DIAGNOSIS — Z12.31 ENCOUNTER FOR SCREENING MAMMOGRAM FOR MALIGNANT NEOPLASM OF BREAST: ICD-10-CM

## 2020-05-06 DIAGNOSIS — R92.2 DENSE BREAST: ICD-10-CM

## 2020-05-06 PROCEDURE — 77067 SCR MAMMO BI INCL CAD: CPT | Performed by: INTERNAL MEDICINE

## 2020-05-06 PROCEDURE — 77063 BREAST TOMOSYNTHESIS BI: CPT | Performed by: INTERNAL MEDICINE

## 2020-05-07 ENCOUNTER — TELEPHONE (OUTPATIENT)
Dept: INTERNAL MEDICINE CLINIC | Facility: CLINIC | Age: 70
End: 2020-05-07

## 2020-05-08 ENCOUNTER — HOSPITAL ENCOUNTER (OUTPATIENT)
Dept: MAMMOGRAPHY | Facility: HOSPITAL | Age: 70
Discharge: HOME | End: 2020-05-08
Attending: INTERNAL MEDICINE
Payer: MEDICARE

## 2020-05-08 DIAGNOSIS — R92.2 INCONCLUSIVE MAMMOGRAM: ICD-10-CM

## 2020-05-08 PROCEDURE — 77065 DX MAMMO INCL CAD UNI: CPT | Performed by: INTERNAL MEDICINE

## 2020-05-08 PROCEDURE — 77061 BREAST TOMOSYNTHESIS UNI: CPT | Performed by: INTERNAL MEDICINE

## 2020-05-14 ENCOUNTER — TELEPHONE (OUTPATIENT)
Dept: INTERNAL MEDICINE CLINIC | Facility: CLINIC | Age: 70
End: 2020-05-14

## 2020-05-14 NOTE — TELEPHONE ENCOUNTER
Dinah Hawkins from [facility] called to inform pt is having Bunionbectomy surgery on right foot with Dr. Kristopher Rosario, date tbd.       Office number [phone] and their fax is [phone]    Faxed over our paperwork and received confirmatio

## 2020-05-18 NOTE — TELEPHONE ENCOUNTER
Future Appointments   Date Time Provider Shane Lizbeth   5/29/2020 11:20 AM Donte Sabillon MD [facility]

## 2020-05-18 NOTE — TELEPHONE ENCOUNTER
We received our for filled out-called pt to sched pre op and she said she wanted surgery sooner sally 7/17 so she was going to call surgeon office and then call us back to sched pre op cpe-form in red folder

## 2020-05-27 ENCOUNTER — LAB ENCOUNTER (OUTPATIENT)
Dept: LAB | Facility: HOSPITAL | Age: 70
End: 2020-05-27
Attending: INTERNAL MEDICINE
Payer: MEDICARE

## 2020-05-27 DIAGNOSIS — E03.9 HYPOTHYROIDISM: ICD-10-CM

## 2020-05-27 DIAGNOSIS — C73 MALIGNANT NEOPLASM OF THYROID GLAND (HCC): Primary | ICD-10-CM

## 2020-05-27 DIAGNOSIS — E03.8 OTHER SPECIFIED HYPOTHYROIDISM: ICD-10-CM

## 2020-05-27 DIAGNOSIS — R73.9 BLOOD GLUCOSE ELEVATED: ICD-10-CM

## 2020-05-27 DIAGNOSIS — E78.00 HYPERCHOLESTEROLEMIA: ICD-10-CM

## 2020-05-27 DIAGNOSIS — I10 ESSENTIAL HYPERTENSION: ICD-10-CM

## 2020-05-27 DIAGNOSIS — Z00.00 ROUTINE GENERAL MEDICAL EXAMINATION AT A HEALTH CARE FACILITY: ICD-10-CM

## 2020-05-27 PROCEDURE — 80053 COMPREHEN METABOLIC PANEL: CPT

## 2020-05-27 PROCEDURE — 83036 HEMOGLOBIN GLYCOSYLATED A1C: CPT

## 2020-05-27 PROCEDURE — 85025 COMPLETE CBC W/AUTO DIFF WBC: CPT

## 2020-05-27 PROCEDURE — 84432 ASSAY OF THYROGLOBULIN: CPT

## 2020-05-27 PROCEDURE — 84443 ASSAY THYROID STIM HORMONE: CPT

## 2020-05-27 PROCEDURE — 82306 VITAMIN D 25 HYDROXY: CPT

## 2020-05-27 PROCEDURE — 80061 LIPID PANEL: CPT

## 2020-05-27 PROCEDURE — 84439 ASSAY OF FREE THYROXINE: CPT

## 2020-05-27 PROCEDURE — 86800 THYROGLOBULIN ANTIBODY: CPT

## 2020-05-27 PROCEDURE — 36415 COLL VENOUS BLD VENIPUNCTURE: CPT

## 2020-05-29 ENCOUNTER — OFFICE VISIT (OUTPATIENT)
Dept: INTERNAL MEDICINE CLINIC | Facility: CLINIC | Age: 70
End: 2020-05-29
Payer: MEDICARE

## 2020-05-29 VITALS
TEMPERATURE: 99 F | WEIGHT: 148 LBS | BODY MASS INDEX: 26.22 KG/M2 | HEIGHT: 63 IN | SYSTOLIC BLOOD PRESSURE: 130 MMHG | HEART RATE: 80 BPM | DIASTOLIC BLOOD PRESSURE: 80 MMHG

## 2020-05-29 DIAGNOSIS — E78.00 HYPERCHOLESTEROLEMIA: ICD-10-CM

## 2020-05-29 DIAGNOSIS — M21.611 BUNION, RIGHT FOOT: ICD-10-CM

## 2020-05-29 DIAGNOSIS — I10 ESSENTIAL HYPERTENSION: ICD-10-CM

## 2020-05-29 DIAGNOSIS — I25.10 CORONARY ARTERY DISEASE INVOLVING NATIVE CORONARY ARTERY OF NATIVE HEART WITHOUT ANGINA PECTORIS: ICD-10-CM

## 2020-05-29 DIAGNOSIS — E03.9 ACQUIRED HYPOTHYROIDISM: ICD-10-CM

## 2020-05-29 DIAGNOSIS — Z01.818 PRE-OP EXAMINATION: Primary | ICD-10-CM

## 2020-05-29 PROCEDURE — 99214 OFFICE O/P EST MOD 30 MIN: CPT | Performed by: INTERNAL MEDICINE

## 2020-05-29 NOTE — PROGRESS NOTES
Sangita Teran is a 68year old female.   Patient presents with:  Pre-Op Exam: AJ rm 3 pre-op for right foot surgery on june 5       HPI:     Patient with HTN, HL, mild CAD per ultrafast heart scan, h/o thyroid cancer, painful right toes here for pre op exam blood pressure    • High cholesterol    • Unspecified disorder of thyroid       Social History:  Social History    Tobacco Use      Smoking status: Never Smoker      Smokeless tobacco: Never Used    Alcohol use:  Yes      Alcohol/week: 0.0 standard drinks which was normal. After review of her work up and current history, I find patient to be medically stable for elective foot surgery without the need for further work up at this time.  She was advised to hold ASA and fish oil for one week prior to the procedu

## 2020-06-02 ENCOUNTER — TELEPHONE (OUTPATIENT)
Dept: INTERNAL MEDICINE CLINIC | Facility: CLINIC | Age: 70
End: 2020-06-02

## 2020-06-02 NOTE — TELEPHONE ENCOUNTER
Sariah Sanchez is calling for pre-op paperwork and EKG for patient having surgery.     Patient had pre-op appointment on 05/29/2020    Please fax [facility] [phone]      thanks

## 2020-08-04 ENCOUNTER — MED REC SCAN ONLY (OUTPATIENT)
Dept: INTERNAL MEDICINE CLINIC | Facility: CLINIC | Age: 70
End: 2020-08-04

## 2020-08-14 ENCOUNTER — TELEPHONE (OUTPATIENT)
Dept: INTERNAL MEDICINE CLINIC | Facility: CLINIC | Age: 70
End: 2020-08-14

## 2020-08-17 ENCOUNTER — OFFICE VISIT (OUTPATIENT)
Dept: INTERNAL MEDICINE CLINIC | Facility: CLINIC | Age: 70
End: 2020-08-17
Payer: MEDICARE

## 2020-08-17 VITALS
TEMPERATURE: 97 F | HEIGHT: 63 IN | DIASTOLIC BLOOD PRESSURE: 78 MMHG | WEIGHT: 151 LBS | HEART RATE: 92 BPM | SYSTOLIC BLOOD PRESSURE: 136 MMHG | BODY MASS INDEX: 26.75 KG/M2

## 2020-08-17 DIAGNOSIS — M25.551 BILATERAL HIP PAIN: ICD-10-CM

## 2020-08-17 DIAGNOSIS — M25.552 BILATERAL HIP PAIN: ICD-10-CM

## 2020-08-17 DIAGNOSIS — E03.9 ACQUIRED HYPOTHYROIDISM: ICD-10-CM

## 2020-08-17 DIAGNOSIS — R74.01 TRANSAMINITIS: ICD-10-CM

## 2020-08-17 DIAGNOSIS — E78.00 HYPERCHOLESTEROLEMIA: ICD-10-CM

## 2020-08-17 DIAGNOSIS — Z00.00 ENCOUNTER FOR ANNUAL HEALTH EXAMINATION: Primary | ICD-10-CM

## 2020-08-17 DIAGNOSIS — Z85.850 HISTORY OF THYROID CANCER: ICD-10-CM

## 2020-08-17 DIAGNOSIS — I10 ESSENTIAL HYPERTENSION: ICD-10-CM

## 2020-08-17 PROCEDURE — G0439 PPPS, SUBSEQ VISIT: HCPCS | Performed by: INTERNAL MEDICINE

## 2020-08-17 NOTE — PROGRESS NOTES
HPI:   Ho Shrestha is a 65year old female who presents for a Medicare Subsequent Annual Wellness visit (Pt already had Initial Annual Wellness). Patient doing well except for b/l hip pain for months.  Hurts after she walks for a long time or if she Hypercholesterolemia     Dry eyes     History of thyroid cancer     Pattern dystrophy of macula     Hyperglycemia     Overweight     Hypothyroidism     Coronary artery disease involving native coronary artery without angina pectoris    Wt Readings from [facility] and colonoscopy (N/A, 3/5/2015).     Her family history includes Arrhythmia in her mother; Breast Cancer in her mother; Cancer in her brother and father; Heart Disorder in her father and mother; Heart Surgery in her mother; Heart Surgery (age of onset: 48) nodes: Cervical, supraclavicular, and axillary nodes normal   Neurologic: Alert and oriented       Vaccination History     Immunization History   Administered Date(s) Administered   • FLU VACC High Dose 65 YRS & Older PRSV Free (60797) 10/04/2015   • Pneum section provided for quick review of chart, separate sheet to patient  [address] Internal Lab or Procedure External Lab or Procedure   Diabetes Screening      HbgA1C   Annually Lab Results   Component Value Date    A1C once after your 65th birthday    Pneumococcal 23 (Pneumovax)  Covered Once after 65 09/26/2017 Please get once after your 65th birthday    Hepatitis B for Moderate/High Risk No vaccine history found Medium/high risk factors:   End-stage renal disease   Hem

## 2020-08-17 NOTE — PATIENT INSTRUCTIONS
Erik King's SCREENING SCHEDULE   Tests on this list are recommended by your physician but may not be covered, or covered at this frequency, by your insurer. Please check with your insurance carrier before scheduling to verify coverage.    PREVENTATIVE more than 100 cigarettes in their lifetime   • Anyone with a family history    Colorectal Cancer Screening  Covered up to Age 76     Colonoscopy Screen   Covered every 10 years- more often if abnormal Colonoscopy due on 03/05/2025 Update [facility] Pneumococcal 13 (Prevnar)  Covered Once after 65 No orders found for this or any previous visit.  Please get once after your 65th birthday    Pneumococcal 23 (Pneumovax)  Covered Once after 65 Orders placed or performed in visit on 09/26/17   • PNEUMOCOCCAL

## 2020-09-05 DIAGNOSIS — I10 ESSENTIAL HYPERTENSION: ICD-10-CM

## 2020-09-05 RX ORDER — ENALAPRIL MALEATE 20 MG/1
20 TABLET ORAL
Qty: 90 TABLET | Refills: 0 | Status: SHIPPED | OUTPATIENT
Start: 2020-09-05 | End: 2020-11-30

## 2020-10-07 ENCOUNTER — HOSPITAL ENCOUNTER (OUTPATIENT)
Dept: GENERAL RADIOLOGY | Facility: HOSPITAL | Age: 70
Discharge: HOME | End: 2020-10-07
Attending: INTERNAL MEDICINE
Payer: MEDICARE

## 2020-10-07 DIAGNOSIS — M25.551 BILATERAL HIP PAIN: ICD-10-CM

## 2020-10-07 DIAGNOSIS — M25.552 BILATERAL HIP PAIN: ICD-10-CM

## 2020-10-07 PROCEDURE — 73523 X-RAY EXAM HIPS BI 5/> VIEWS: CPT | Performed by: INTERNAL MEDICINE

## 2020-10-12 ENCOUNTER — TELEPHONE (OUTPATIENT)
Dept: INTERNAL MEDICINE CLINIC | Facility: CLINIC | Age: 70
End: 2020-10-12

## 2020-11-28 NOTE — MR AVS SNAPSHOT
Brief Operative Note    Patient: Rupa Padilla 64 year old female    MRN: [id number]    Surgeon(s): Nicanor Piedra MD  Phone Number: [phone]                       Surgeon(s) and Role:     * Nicanor Piedra MD - Primary    Pre-Op Diagnosis: left foot abscess and necrotic fascia s/p drainage and debridement with open packing     Post-Op Diagnosis: same     Procedure: Procedure(s):  left foot first ray resection and resection medial cuneiform with primary closure.    Anesthesia Type: General                                   Complications: None    Findings: no ciro purulence    Specimens Removed: No specimens collected     Estimated Blood Loss: 50cc    Assistant Tasks: Opening and closing     Implants: * No implants in log *    Plan:  The medial wound was barely able to be closed despite the resection of the medial cuneiform.  The medial and lateral ankle have areas of skin ischemia.  I believe foot salvage will depend on how much skin lives.  I think she is at significant risk of below knee amputation to obtain a closed wound with viable skin.  Sacrificing the medial cuneiform and tibialis anterior (which was necrotic) will negatively affect function and increase risk of recurrent ulceration.  The ehl was necrotic and it was transected and excised at the level of the navicular.    I was present for the key portions of the procedure and was immediately available for the non-key portions         [facility] [address] [address] [phone]               Thank you for choosing us for your health care visit with Isacc Solares MD.  We are glad to serve you and happy to provide you with this SYNTHROID 125 MCG Tabs   Generic drug:  Levothyroxine Sodium   Take 125 mcg by mouth daily. Follow-up Instructions     Return if symptoms worsen or fail to improve.          MyChart     Visit [facility]  You can access your MyChart to more a

## 2020-11-30 DIAGNOSIS — I10 ESSENTIAL HYPERTENSION: ICD-10-CM

## 2020-11-30 RX ORDER — ENALAPRIL MALEATE 20 MG/1
TABLET ORAL
Qty: 90 TABLET | Refills: 1 | Status: SHIPPED | OUTPATIENT
Start: 2020-11-30 | End: 2021-06-23

## 2021-02-04 ENCOUNTER — LAB ENCOUNTER (OUTPATIENT)
Dept: LAB | Age: 71
End: 2021-02-04
Attending: INTERNAL MEDICINE
Payer: MEDICARE

## 2021-02-04 DIAGNOSIS — R74.01 TRANSAMINITIS: ICD-10-CM

## 2021-02-04 LAB
ALBUMIN SERPL-MCNC: 4 G/DL (ref 3.4–5)
ALP LIVER SERPL-CCNC: 66 U/L
ALT SERPL-CCNC: 36 U/L
AST SERPL-CCNC: 11 U/L (ref 15–37)
BILIRUB DIRECT SERPL-MCNC: 0.1 MG/DL (ref 0–0.2)
BILIRUB SERPL-MCNC: 0.5 MG/DL (ref 0.1–2)
DEPRECATED HBV CORE AB SER IA-ACNC: 104.9 NG/ML
HAV IGM SER QL: NONREACTIVE
HBV CORE IGM SER QL: NONREACTIVE
HBV SURFACE AG SERPL QL IA: NONREACTIVE
HCV AB SERPL QL IA: NONREACTIVE
M PROTEIN MFR SERPL ELPH: 7.2 G/DL (ref 6.4–8.2)

## 2021-02-04 PROCEDURE — 80074 ACUTE HEPATITIS PANEL: CPT

## 2021-02-04 PROCEDURE — 82728 ASSAY OF FERRITIN: CPT

## 2021-02-04 PROCEDURE — 80076 HEPATIC FUNCTION PANEL: CPT

## 2021-02-04 PROCEDURE — 36415 COLL VENOUS BLD VENIPUNCTURE: CPT

## 2021-02-12 ENCOUNTER — MED REC SCAN ONLY (OUTPATIENT)
Dept: INTERNAL MEDICINE CLINIC | Facility: CLINIC | Age: 71
End: 2021-02-12

## 2021-02-12 NOTE — PROGRESS NOTES
[id number] [facility] note DOS 1/26/21 Linsey Malik MD on provider TB desk for review.

## 2021-02-18 ENCOUNTER — OFFICE VISIT (OUTPATIENT)
Dept: INTERNAL MEDICINE CLINIC | Facility: CLINIC | Age: 71
End: 2021-02-18
Payer: MEDICARE

## 2021-02-18 VITALS
WEIGHT: 151.81 LBS | SYSTOLIC BLOOD PRESSURE: 132 MMHG | OXYGEN SATURATION: 97 % | TEMPERATURE: 98 F | BODY MASS INDEX: 26.9 KG/M2 | HEIGHT: 63 IN | DIASTOLIC BLOOD PRESSURE: 74 MMHG | HEART RATE: 84 BPM

## 2021-02-18 DIAGNOSIS — Z85.850 HISTORY OF THYROID CANCER: ICD-10-CM

## 2021-02-18 DIAGNOSIS — M25.551 BILATERAL HIP PAIN: Primary | ICD-10-CM

## 2021-02-18 DIAGNOSIS — E03.9 ACQUIRED HYPOTHYROIDISM: ICD-10-CM

## 2021-02-18 DIAGNOSIS — R73.9 BLOOD GLUCOSE ELEVATED: ICD-10-CM

## 2021-02-18 DIAGNOSIS — I10 ESSENTIAL HYPERTENSION: ICD-10-CM

## 2021-02-18 DIAGNOSIS — F51.01 PRIMARY INSOMNIA: ICD-10-CM

## 2021-02-18 DIAGNOSIS — M25.552 BILATERAL HIP PAIN: Primary | ICD-10-CM

## 2021-02-18 PROCEDURE — 99214 OFFICE O/P EST MOD 30 MIN: CPT | Performed by: INTERNAL MEDICINE

## 2021-02-18 NOTE — PROGRESS NOTES
Ya Titus is a 65year old female. Patient presents with:   Follow - Up: mn room 4 pt here for follow up       HPI:     Patient here for f/u-  C/o Bilateral hip pain for many months now, notable if she bends down to  something, ok once she is up • MULTI-VITAMIN/MINERALS OR TABS 1 TABLET DAILY     • FISH OIL 3 capsules daily        Past Medical History:   Diagnosis Date   • Herpes zoster    • High blood pressure    • High cholesterol    • Unspecified disorder of thyroid       Social History:  Soc deficits    ASSESSMENT AND PLAN:   Bilateral hip pain - likely r/t OA, otc nsaids prn, see ortho if pain worsens  Essential hypertension- controlled, CPM  Primary insomnia- sleep hygiene reviewed, advised to cut caffeine  Blood glucose elevated- watch diet

## 2021-03-03 ENCOUNTER — MED REC SCAN ONLY (OUTPATIENT)
Dept: INTERNAL MEDICINE CLINIC | Facility: CLINIC | Age: 71
End: 2021-03-03

## 2021-03-08 DIAGNOSIS — Z23 NEED FOR VACCINATION: ICD-10-CM

## 2021-03-24 ENCOUNTER — TELEPHONE (OUTPATIENT)
Dept: ORTHOPEDICS CLINIC | Facility: CLINIC | Age: 71
End: 2021-03-24

## 2021-03-24 NOTE — TELEPHONE ENCOUNTER
Patient scheduled appointment with Dr. Jessica Gooden on 3/26/2021 for arthritis in hips. Please advise if x-rays needed.

## 2021-03-26 ENCOUNTER — HOSPITAL ENCOUNTER (OUTPATIENT)
Dept: GENERAL RADIOLOGY | Age: 71
Discharge: HOME | End: 2021-03-26
Attending: ORTHOPAEDIC SURGERY
Payer: MEDICARE

## 2021-03-26 ENCOUNTER — OFFICE VISIT (OUTPATIENT)
Dept: ORTHOPEDICS CLINIC | Facility: CLINIC | Age: 71
End: 2021-03-26
Payer: MEDICARE

## 2021-03-26 DIAGNOSIS — M25.551 BILATERAL HIP PAIN: ICD-10-CM

## 2021-03-26 DIAGNOSIS — M25.552 BILATERAL HIP PAIN: ICD-10-CM

## 2021-03-26 DIAGNOSIS — M54.50 CHRONIC MIDLINE LOW BACK PAIN, UNSPECIFIED WHETHER SCIATICA PRESENT: Primary | ICD-10-CM

## 2021-03-26 DIAGNOSIS — G89.29 CHRONIC MIDLINE LOW BACK PAIN, UNSPECIFIED WHETHER SCIATICA PRESENT: Primary | ICD-10-CM

## 2021-03-26 PROCEDURE — 73523 X-RAY EXAM HIPS BI 5/> VIEWS: CPT | Performed by: ORTHOPAEDIC SURGERY

## 2021-03-26 NOTE — PROGRESS NOTES
Note that patient made appointment for today as she thought we were the spine specialists. She reports chronic pain in her low back and progression of scoliosis over time. She had referral to both myself as well as Dr. Harish Nieto.   She states she called

## 2021-04-06 ENCOUNTER — TELEPHONE (OUTPATIENT)
Dept: INTERNAL MEDICINE CLINIC | Facility: CLINIC | Age: 71
End: 2021-04-06

## 2021-04-06 NOTE — TELEPHONE ENCOUNTER
Pt called to schedule pre-op appt. She is having removal of screws from her foot on 5-18-21 with Dr. Tereza Desai.  Phone number is [phone]    Future Appointments   Date Time Provider Shane Nieves   4/26/2021  9:40 AM ALONDRA Abraham

## 2021-04-19 NOTE — TELEPHONE ENCOUNTER
Faxed again, received confirmation.      Placed in [facility] folder

## 2021-04-30 ENCOUNTER — OFFICE VISIT (OUTPATIENT)
Dept: INTERNAL MEDICINE CLINIC | Facility: CLINIC | Age: 71
End: 2021-04-30
Payer: MEDICARE

## 2021-04-30 ENCOUNTER — LAB ENCOUNTER (OUTPATIENT)
Dept: LAB | Age: 71
End: 2021-04-30
Attending: NURSE PRACTITIONER
Payer: MEDICARE

## 2021-04-30 VITALS
SYSTOLIC BLOOD PRESSURE: 118 MMHG | WEIGHT: 153 LBS | TEMPERATURE: 97 F | HEIGHT: 63 IN | HEART RATE: 100 BPM | DIASTOLIC BLOOD PRESSURE: 64 MMHG | BODY MASS INDEX: 27.11 KG/M2

## 2021-04-30 DIAGNOSIS — T84.213D: Primary | ICD-10-CM

## 2021-04-30 DIAGNOSIS — T84.213D: ICD-10-CM

## 2021-04-30 DIAGNOSIS — L90.5 SCAR OF FOOT: ICD-10-CM

## 2021-04-30 DIAGNOSIS — I25.10 CORONARY ARTERY DISEASE INVOLVING NATIVE CORONARY ARTERY OF NATIVE HEART WITHOUT ANGINA PECTORIS: ICD-10-CM

## 2021-04-30 DIAGNOSIS — E78.00 HYPERCHOLESTEROLEMIA: ICD-10-CM

## 2021-04-30 DIAGNOSIS — E03.9 ACQUIRED HYPOTHYROIDISM: ICD-10-CM

## 2021-04-30 DIAGNOSIS — I10 ESSENTIAL HYPERTENSION: ICD-10-CM

## 2021-04-30 PROCEDURE — 99214 OFFICE O/P EST MOD 30 MIN: CPT | Performed by: NURSE PRACTITIONER

## 2021-04-30 PROCEDURE — 36415 COLL VENOUS BLD VENIPUNCTURE: CPT

## 2021-04-30 PROCEDURE — 80053 COMPREHEN METABOLIC PANEL: CPT

## 2021-04-30 PROCEDURE — 85025 COMPLETE CBC W/AUTO DIFF WBC: CPT

## 2021-06-23 DIAGNOSIS — I10 ESSENTIAL HYPERTENSION: ICD-10-CM

## 2021-06-23 RX ORDER — ENALAPRIL MALEATE 20 MG/1
TABLET ORAL
Qty: 90 TABLET | Refills: 0 | Status: SHIPPED | OUTPATIENT
Start: 2021-06-23 | End: 2021-09-18

## 2021-06-23 NOTE — TELEPHONE ENCOUNTER
Last Ov:4/30/21  Upcoming appt:8/11/21  Last labs:4/30/21 cmp, cbc  Last Rx:11/30/20 enalapril     Per Protocol sent for review

## 2021-07-02 ENCOUNTER — TELEPHONE (OUTPATIENT)
Dept: INTERNAL MEDICINE CLINIC | Facility: CLINIC | Age: 71
End: 2021-07-02

## 2021-07-02 DIAGNOSIS — Z12.31 ENCOUNTER FOR SCREENING MAMMOGRAM FOR MALIGNANT NEOPLASM OF BREAST: Primary | ICD-10-CM

## 2021-07-02 NOTE — TELEPHONE ENCOUNTER
Pt requesting mammogram order be placed so she can complete before scheduled wellness visit. She would like to be notified via [employer] once placed.

## 2021-07-02 NOTE — TELEPHONE ENCOUNTER
Last mammo 5/28/20    CONCLUSION:     DIAGNOSTIC CATEGORY 2--BENIGN FINDING:         RECOMMENDATIONS:     ROUTINE MAMMOGRAM AND CLINICAL EVALUATION IN 12 MONTHS.       Order pended.      Future Appointments   Date Time Provider Shane Nieves   7/26/2021

## 2021-07-08 ENCOUNTER — HOSPITAL ENCOUNTER (OUTPATIENT)
Dept: MAMMOGRAPHY | Facility: HOSPITAL | Age: 71
Discharge: HOME | End: 2021-07-08
Attending: INTERNAL MEDICINE
Payer: MEDICARE

## 2021-07-08 DIAGNOSIS — Z12.31 ENCOUNTER FOR SCREENING MAMMOGRAM FOR MALIGNANT NEOPLASM OF BREAST: ICD-10-CM

## 2021-07-08 PROCEDURE — 77067 SCR MAMMO BI INCL CAD: CPT | Performed by: INTERNAL MEDICINE

## 2021-07-08 PROCEDURE — 77063 BREAST TOMOSYNTHESIS BI: CPT | Performed by: INTERNAL MEDICINE

## 2021-08-18 ENCOUNTER — LAB ENCOUNTER (OUTPATIENT)
Dept: LAB | Age: 71
End: 2021-08-18
Attending: INTERNAL MEDICINE
Payer: MEDICARE

## 2021-08-18 DIAGNOSIS — M25.551 BILATERAL HIP PAIN: ICD-10-CM

## 2021-08-18 DIAGNOSIS — Z85.850 HISTORY OF THYROID CANCER: ICD-10-CM

## 2021-08-18 DIAGNOSIS — E03.9 ACQUIRED HYPOTHYROIDISM: ICD-10-CM

## 2021-08-18 DIAGNOSIS — F51.01 PRIMARY INSOMNIA: ICD-10-CM

## 2021-08-18 DIAGNOSIS — I10 ESSENTIAL HYPERTENSION: ICD-10-CM

## 2021-08-18 DIAGNOSIS — R73.9 BLOOD GLUCOSE ELEVATED: ICD-10-CM

## 2021-08-18 DIAGNOSIS — M25.552 BILATERAL HIP PAIN: ICD-10-CM

## 2021-08-18 LAB
ALBUMIN SERPL-MCNC: 4 G/DL (ref 3.4–5)
ALBUMIN/GLOB SERPL: 1.1 {RATIO} (ref 1–2)
ALP LIVER SERPL-CCNC: 65 U/L
ALT SERPL-CCNC: 37 U/L
ANION GAP SERPL CALC-SCNC: 3 MMOL/L (ref 0–18)
AST SERPL-CCNC: 27 U/L (ref 15–37)
BASOPHILS # BLD AUTO: 0.07 X10(3) UL (ref 0–0.2)
BASOPHILS NFR BLD AUTO: 1 %
BILIRUB SERPL-MCNC: 0.5 MG/DL (ref 0.1–2)
BUN BLD-MCNC: 19 MG/DL (ref 7–18)
CALCIUM BLD-MCNC: 8.3 MG/DL (ref 8.5–10.1)
CHLORIDE SERPL-SCNC: 107 MMOL/L (ref 98–112)
CHOLEST SMN-MCNC: 163 MG/DL (ref ?–200)
CO2 SERPL-SCNC: 31 MMOL/L (ref 21–32)
CREAT BLD-MCNC: 0.86 MG/DL
EOSINOPHIL # BLD AUTO: 0.15 X10(3) UL (ref 0–0.7)
EOSINOPHIL NFR BLD AUTO: 2.1 %
ERYTHROCYTE [DISTWIDTH] IN BLOOD BY AUTOMATED COUNT: 12.1 %
EST. AVERAGE GLUCOSE BLD GHB EST-MCNC: 123 MG/DL (ref 68–126)
GLOBULIN PLAS-MCNC: 3.5 G/DL (ref 2.8–4.4)
GLUCOSE BLD-MCNC: 98 MG/DL (ref 70–99)
HBA1C MFR BLD HPLC: 5.9 % (ref ?–5.7)
HCT VFR BLD AUTO: 48.3 %
HDLC SERPL-MCNC: 50 MG/DL (ref 40–59)
HGB BLD-MCNC: 14.8 G/DL
IMM GRANULOCYTES # BLD AUTO: 0.03 X10(3) UL (ref 0–1)
IMM GRANULOCYTES NFR BLD: 0.4 %
LDLC SERPL CALC-MCNC: 93 MG/DL (ref ?–100)
LYMPHOCYTES # BLD AUTO: 2.04 X10(3) UL (ref 1–4)
LYMPHOCYTES NFR BLD AUTO: 27.9 %
M PROTEIN MFR SERPL ELPH: 7.5 G/DL (ref 6.4–8.2)
MCH RBC QN AUTO: 28.9 PG (ref 26–34)
MCHC RBC AUTO-ENTMCNC: 30.6 G/DL (ref 31–37)
MCV RBC AUTO: 94.3 FL
MONOCYTES # BLD AUTO: 0.51 X10(3) UL (ref 0.1–1)
MONOCYTES NFR BLD AUTO: 7 %
NEUTROPHILS # BLD AUTO: 4.5 X10 (3) UL (ref 1.5–7.7)
NEUTROPHILS # BLD AUTO: 4.5 X10(3) UL (ref 1.5–7.7)
NEUTROPHILS NFR BLD AUTO: 61.6 %
NONHDLC SERPL-MCNC: 113 MG/DL (ref ?–130)
OSMOLALITY SERPL CALC.SUM OF ELEC: 294 MOSM/KG (ref 275–295)
PATIENT FASTING Y/N/NP: YES
PATIENT FASTING Y/N/NP: YES
PLATELET # BLD AUTO: 238 10(3)UL (ref 150–450)
POTASSIUM SERPL-SCNC: 4.5 MMOL/L (ref 3.5–5.1)
RBC # BLD AUTO: 5.12 X10(6)UL
SODIUM SERPL-SCNC: 141 MMOL/L (ref 136–145)
T4 FREE SERPL-MCNC: 1.5 NG/DL (ref 0.8–1.7)
TRIGL SERPL-MCNC: 110 MG/DL (ref 30–149)
TSI SER-ACNC: 0.44 MIU/ML (ref 0.36–3.74)
VLDLC SERPL CALC-MCNC: 18 MG/DL (ref 0–30)
WBC # BLD AUTO: 7.3 X10(3) UL (ref 4–11)

## 2021-08-18 PROCEDURE — 83036 HEMOGLOBIN GLYCOSYLATED A1C: CPT

## 2021-08-18 PROCEDURE — 84432 ASSAY OF THYROGLOBULIN: CPT

## 2021-08-18 PROCEDURE — 80053 COMPREHEN METABOLIC PANEL: CPT

## 2021-08-18 PROCEDURE — 85025 COMPLETE CBC W/AUTO DIFF WBC: CPT

## 2021-08-18 PROCEDURE — 86800 THYROGLOBULIN ANTIBODY: CPT

## 2021-08-18 PROCEDURE — 84439 ASSAY OF FREE THYROXINE: CPT

## 2021-08-18 PROCEDURE — 80061 LIPID PANEL: CPT

## 2021-08-18 PROCEDURE — 36415 COLL VENOUS BLD VENIPUNCTURE: CPT

## 2021-08-18 PROCEDURE — 84443 ASSAY THYROID STIM HORMONE: CPT

## 2021-08-20 ENCOUNTER — OFFICE VISIT (OUTPATIENT)
Dept: INTERNAL MEDICINE CLINIC | Facility: CLINIC | Age: 71
End: 2021-08-20
Payer: MEDICARE

## 2021-08-20 VITALS
DIASTOLIC BLOOD PRESSURE: 70 MMHG | HEART RATE: 97 BPM | HEIGHT: 62.99 IN | RESPIRATION RATE: 16 BRPM | WEIGHT: 148 LBS | TEMPERATURE: 98 F | OXYGEN SATURATION: 98 % | SYSTOLIC BLOOD PRESSURE: 128 MMHG | BODY MASS INDEX: 26.22 KG/M2

## 2021-08-20 DIAGNOSIS — Z00.00 ENCOUNTER FOR ANNUAL HEALTH EXAMINATION: Primary | ICD-10-CM

## 2021-08-20 DIAGNOSIS — I10 PRIMARY HYPERTENSION: ICD-10-CM

## 2021-08-20 DIAGNOSIS — Z85.850 HISTORY OF THYROID CANCER: ICD-10-CM

## 2021-08-20 DIAGNOSIS — Z78.0 POST-MENOPAUSAL: ICD-10-CM

## 2021-08-20 DIAGNOSIS — E78.00 HYPERCHOLESTEROLEMIA: ICD-10-CM

## 2021-08-20 DIAGNOSIS — I25.10 CORONARY ARTERY DISEASE INVOLVING NATIVE CORONARY ARTERY WITHOUT ANGINA PECTORIS, UNSPECIFIED WHETHER NATIVE OR TRANSPLANTED HEART: ICD-10-CM

## 2021-08-20 LAB
THYROGLOBULIN AB: <0.9 IU/ML
THYROGLOBULIN, SERUM OR PLASMA: <0.1 NG/ML

## 2021-08-20 PROCEDURE — G0439 PPPS, SUBSEQ VISIT: HCPCS | Performed by: INTERNAL MEDICINE

## 2021-08-20 RX ORDER — FLUOROURACIL 50 MG/G
1 CREAM TOPICAL 2 TIMES DAILY PRN
COMMUNITY
Start: 2021-06-07

## 2021-08-20 NOTE — PATIENT INSTRUCTIONS
Clari King's SCREENING SCHEDULE   Tests on this list are recommended by your physician but may not be covered, or covered at this frequency, by your insurer. Please check with your insurance carrier before scheduling to verify coverage.    PREVENTATI 09/19/2017      No recommendations at this time   Pap and Pelvic    Pap   Covered every 2 years for women at normal risk;  Annually if at high risk -  No recommendations at this time    Chlamydia Annually if high risk -  No recommendations at this time   Sc http://www. idph.state. [place]/public/books/advin.htm  A link to the [employer]. This site has a lot of good information including definitions of the different types of Advance Directives.  It also has the State forms available on it's webs

## 2021-08-20 NOTE — PROGRESS NOTES
.tm  HPI:   Qing Bernabe is a 66year old female who presents for a Medicare Subsequent Annual Wellness visit (Pt already had Initial Annual Wellness). Patient with HTN, HL, CAD, hypothyroidism here for wellness. Chronic issues stable.   Left neck with Opener: Have you ever had a drink first thing in the morning to steady your nerves or to get rid of a hangover (Eye opener)?: 0, Total Score: 1        Patient Care Team: Patient Care Team:  Yesy Vera MD as PCP - General (Internal Medicine)  [facility] (two) times daily. ASPIR-81 81 MG OR TBEC, Take  by mouth. 2 daily.    CALCIUM + D OR, 2 tablets PO QD  FIBERCON OR, QD  MULTI-VITAMIN/MINERALS OR TABS, 1 TABLET DAILY  FISH OIL, 3 capsules daily       MEDICAL INFORMATION:   She  has a past medical history symmetrical, no JVD, left posterior neck muscle tightness   Breasts:   No masses or lumps, no LAD   Lungs:   Clear to auscultation bilaterally, respirations unlabored   Heart:  Regular rate and rhythm, S1 and S2 normal   Abdomen:   Soft, non-tender, bowel lifestyle, and exercise. Return in about 1 year (around 8/20/2022) for wellness.      Stephanie Lara MD, 8/20/2021     General Health     In the past six months, have you lost more than 10 pounds without trying?: 2 - No  Has your appetite been poor?: No Colonoscopy   Covered every 10 years    Covered every 2 years if patient is at high risk or previous colonoscopy was abnormal 03/05/2015    Colonoscopy due on 03/05/2025    Flexible Sigmoidoscopy   Covered every 4 years -    Fecal Occult Blood Test Covered Results   Component Value Date    K 4.5 08/18/2021         Creatinine   Annually Lab Results   Component Value Date    CREATSERUM 0.86 08/18/2021         BUN Annually Lab Results   Component Value Date    BUN 19 (H) 08/18/2021       Drug Serum Conc Annuall

## 2021-09-18 DIAGNOSIS — I10 ESSENTIAL HYPERTENSION: ICD-10-CM

## 2021-09-18 RX ORDER — ENALAPRIL MALEATE 20 MG/1
TABLET ORAL
Qty: 90 TABLET | Refills: 1 | Status: SHIPPED | OUTPATIENT
Start: 2021-09-18

## 2021-09-22 ENCOUNTER — TELEPHONE (OUTPATIENT)
Dept: INTERNAL MEDICINE CLINIC | Facility: CLINIC | Age: 71
End: 2021-09-22

## 2021-09-22 DIAGNOSIS — M54.2 NECK PAIN: Primary | ICD-10-CM

## 2021-09-22 DIAGNOSIS — M25.512 LEFT SHOULDER PAIN, UNSPECIFIED CHRONICITY: ICD-10-CM

## 2021-09-22 DIAGNOSIS — M79.622 LEFT UPPER ARM PAIN: ICD-10-CM

## 2021-09-22 NOTE — TELEPHONE ENCOUNTER
Per note:    Left neck with muscle tightness for many months, working with family friend who is doing wonders for loosening her neck.  If he becomes unavailable, she will need referral for PT (she will message me)    Okay for external PT referral?

## 2021-09-22 NOTE — TELEPHONE ENCOUNTER
Patient is calling and would like to go to PT now. She talked to Dr Ayaz Haider about her shoulder at her appointment.   Dr Guillermo How told her she didn't have to see ortho that she could go to PT    She is going to make an appointment for [facility]

## 2021-10-01 ENCOUNTER — MED REC SCAN ONLY (OUTPATIENT)
Dept: INTERNAL MEDICINE CLINIC | Facility: CLINIC | Age: 71
End: 2021-10-01

## 2021-10-15 ENCOUNTER — TELEPHONE (OUTPATIENT)
Dept: INTERNAL MEDICINE CLINIC | Facility: CLINIC | Age: 71
End: 2021-10-15

## 2021-10-26 ENCOUNTER — TELEPHONE (OUTPATIENT)
Dept: INTERNAL MEDICINE CLINIC | Facility: CLINIC | Age: 71
End: 2021-10-26

## 2021-11-30 ENCOUNTER — TELEPHONE (OUTPATIENT)
Dept: INTERNAL MEDICINE CLINIC | Facility: CLINIC | Age: 71
End: 2021-11-30

## 2021-11-30 NOTE — TELEPHONE ENCOUNTER
Physical therapy from [facility] were faxed to [phone]. Confirmation received. Put to scanning. laceration

## 2021-11-30 NOTE — TELEPHONE ENCOUNTER
Received office visit for physical therapy from [facility]. Put in T.B. bin for review and signature. Ready to fax to [phone] when completed.

## 2021-12-02 ENCOUNTER — TELEPHONE (OUTPATIENT)
Dept: INTERNAL MEDICINE CLINIC | Facility: CLINIC | Age: 71
End: 2021-12-02

## 2021-12-02 NOTE — TELEPHONE ENCOUNTER
Naresh Sampson from DPT dropped off patient satisfaction survey for DPT.  She also stated that pt has completed physical therapy     Placed survey in TB folder up front

## 2022-03-14 RX ORDER — ENALAPRIL MALEATE 20 MG/1
TABLET ORAL
Qty: 90 TABLET | Refills: 0 | Status: SHIPPED | OUTPATIENT
Start: 2022-03-14

## 2022-03-14 NOTE — TELEPHONE ENCOUNTER
Last OV 8/20/21  Last PE 8/20/21  Last REFILL   Medication Quantity Refills Start End   ENALAPRIL MALEATE 20 MG Oral Tab 90 tablet 1 9/18/2021      Last LABS 8/18/21 a1c, cbc, tsh, cmp, lipid    No future appointments. Per PROTOCOL?   Failed     Please Advise

## 2022-04-09 ENCOUNTER — HOSPITAL ENCOUNTER (OUTPATIENT)
Dept: BONE DENSITY | Age: 72
Discharge: HOME | End: 2022-04-09
Attending: INTERNAL MEDICINE
Payer: MEDICARE

## 2022-04-09 DIAGNOSIS — Z78.0 POST-MENOPAUSAL: ICD-10-CM

## 2022-04-09 PROCEDURE — 77080 DXA BONE DENSITY AXIAL: CPT | Performed by: INTERNAL MEDICINE

## 2022-05-02 ENCOUNTER — OFFICE VISIT (OUTPATIENT)
Dept: INTERNAL MEDICINE CLINIC | Facility: CLINIC | Age: 72
End: 2022-05-02
Payer: MEDICARE

## 2022-05-02 VITALS
HEART RATE: 86 BPM | BODY MASS INDEX: 25.16 KG/M2 | HEIGHT: 63 IN | OXYGEN SATURATION: 100 % | SYSTOLIC BLOOD PRESSURE: 124 MMHG | DIASTOLIC BLOOD PRESSURE: 72 MMHG | WEIGHT: 142 LBS | TEMPERATURE: 97 F

## 2022-05-02 DIAGNOSIS — Z12.31 ENCOUNTER FOR SCREENING MAMMOGRAM FOR MALIGNANT NEOPLASM OF BREAST: ICD-10-CM

## 2022-05-02 DIAGNOSIS — M79.18 MUSCULOSKELETAL PAIN: ICD-10-CM

## 2022-05-02 DIAGNOSIS — M54.2 NECK PAIN: Primary | ICD-10-CM

## 2022-05-02 PROCEDURE — 99213 OFFICE O/P EST LOW 20 MIN: CPT | Performed by: NURSE PRACTITIONER

## 2022-05-02 RX ORDER — CYCLOBENZAPRINE HCL 5 MG
5 TABLET ORAL NIGHTLY PRN
Qty: 10 TABLET | Refills: 0 | Status: SHIPPED | OUTPATIENT
Start: 2022-05-02

## 2022-05-02 RX ORDER — NAPROXEN 500 MG/1
500 TABLET ORAL 2 TIMES DAILY WITH MEALS
Qty: 20 TABLET | Refills: 0 | Status: SHIPPED | OUTPATIENT
Start: 2022-05-02

## 2022-05-02 NOTE — PATIENT INSTRUCTIONS
Can continue with tylenol PRN while using Naprosyn  No Advil/ibuprofen  Flexeril nightly as needed. May make you sleepy  Will consider Physical Therapy if not improving.

## 2022-05-04 ENCOUNTER — TELEPHONE (OUTPATIENT)
Dept: INTERNAL MEDICINE CLINIC | Facility: CLINIC | Age: 72
End: 2022-05-04

## 2022-05-04 NOTE — TELEPHONE ENCOUNTER
Received fax of denial for medication Cyclobenzaprine 5 mg. Placed denial in SD for further review and if PA or alternative is needed?

## 2022-06-06 ENCOUNTER — TELEPHONE (OUTPATIENT)
Dept: INTERNAL MEDICINE CLINIC | Facility: CLINIC | Age: 72
End: 2022-06-06

## 2022-06-06 DIAGNOSIS — I10 ESSENTIAL HYPERTENSION: ICD-10-CM

## 2022-06-06 DIAGNOSIS — Z00.00 ENCOUNTER FOR ANNUAL HEALTH EXAMINATION: Primary | ICD-10-CM

## 2022-06-06 NOTE — TELEPHONE ENCOUNTER
Future Appointments   Date Time Provider Shane Nieves   8/5/2022  3:40 PM Alberto Escobedo MD [facility]     Annual Physical   Lab is [facility]  Pt aware to fast and to complete labs no sooner than 2 weeks prior to physical   No call back required

## 2022-06-23 ENCOUNTER — TELEPHONE (OUTPATIENT)
Dept: INTERNAL MEDICINE CLINIC | Facility: CLINIC | Age: 72
End: 2022-06-23

## 2022-06-23 DIAGNOSIS — E78.00 HYPERCHOLESTEROLEMIA: ICD-10-CM

## 2022-06-23 DIAGNOSIS — Z85.850 HISTORY OF THYROID CANCER: Primary | ICD-10-CM

## 2022-06-23 DIAGNOSIS — I10 PRIMARY HYPERTENSION: ICD-10-CM

## 2022-06-23 DIAGNOSIS — E55.9 VITAMIN D DEFICIENCY: ICD-10-CM

## 2022-06-23 DIAGNOSIS — E03.9 ACQUIRED HYPOTHYROIDISM: ICD-10-CM

## 2022-06-23 NOTE — TELEPHONE ENCOUNTER
[facility] PSRs- please inform pt upon call back. Thanks!

## 2022-06-23 NOTE — TELEPHONE ENCOUNTER
Pt dropped off lab request from her endocrinologist. She is wanting TB to order all the tests for her so she can do them through [facility]. Pt wants the results to go to the endocrinologist as well. All the information is on the sheet pt dropped off. Sheet is in TB folder up front. Pt would like a call once ordered so she can schedule them.     Future Appointments   Date Time Provider Shane Lizbeth   8/5/2022  3:40 PM Day Ortiz MD [facility]

## 2022-06-24 DIAGNOSIS — I10 ESSENTIAL HYPERTENSION: ICD-10-CM

## 2022-06-24 RX ORDER — ENALAPRIL MALEATE 20 MG/1
TABLET ORAL
Qty: 90 TABLET | Refills: 0 | Status: SHIPPED | OUTPATIENT
Start: 2022-06-24

## 2022-06-24 NOTE — TELEPHONE ENCOUNTER
Last VISIT - 5/2/22 Neck pain    Last CPE - 8/20/21    Last REFILL -     ENALAPRIL 20 MG Oral Tab 90 tablet 0 3/14/2022     Last LABS - 8/18/21 a1c, cbc, tsh, cmp, lipid    Future Appointments   Date Time Provider Shane Nieves   8/5/2022  3:40 PM Clari Queen MD [facility]     Per PROTOCOL? PASSED    Please Approve or Deny.

## 2022-07-12 ENCOUNTER — HOSPITAL ENCOUNTER (OUTPATIENT)
Dept: MAMMOGRAPHY | Facility: HOSPITAL | Age: 72
Discharge: HOME | End: 2022-07-12
Attending: NURSE PRACTITIONER
Payer: MEDICARE

## 2022-07-12 DIAGNOSIS — Z12.31 ENCOUNTER FOR SCREENING MAMMOGRAM FOR MALIGNANT NEOPLASM OF BREAST: ICD-10-CM

## 2022-07-12 PROCEDURE — 77063 BREAST TOMOSYNTHESIS BI: CPT | Performed by: NURSE PRACTITIONER

## 2022-07-12 PROCEDURE — 77067 SCR MAMMO BI INCL CAD: CPT | Performed by: NURSE PRACTITIONER

## 2022-07-18 ENCOUNTER — LAB ENCOUNTER (OUTPATIENT)
Dept: LAB | Age: 72
End: 2022-07-18
Attending: INTERNAL MEDICINE
Payer: MEDICARE

## 2022-07-18 DIAGNOSIS — E03.9 ACQUIRED HYPOTHYROIDISM: ICD-10-CM

## 2022-07-18 DIAGNOSIS — E55.9 VITAMIN D DEFICIENCY: ICD-10-CM

## 2022-07-18 DIAGNOSIS — I10 PRIMARY HYPERTENSION: ICD-10-CM

## 2022-07-18 DIAGNOSIS — Z00.00 ENCOUNTER FOR ANNUAL HEALTH EXAMINATION: ICD-10-CM

## 2022-07-18 DIAGNOSIS — E78.00 HYPERCHOLESTEROLEMIA: ICD-10-CM

## 2022-07-18 DIAGNOSIS — Z85.850 HISTORY OF THYROID CANCER: ICD-10-CM

## 2022-07-18 DIAGNOSIS — I10 ESSENTIAL HYPERTENSION: ICD-10-CM

## 2022-07-18 LAB
ALBUMIN SERPL-MCNC: 3.9 G/DL (ref 3.4–5)
ALBUMIN/GLOB SERPL: 1.1 {RATIO} (ref 1–2)
ALP LIVER SERPL-CCNC: 66 U/L
ALT SERPL-CCNC: 31 U/L
ANION GAP SERPL CALC-SCNC: 5 MMOL/L (ref 0–18)
AST SERPL-CCNC: 25 U/L (ref 15–37)
BASOPHILS # BLD AUTO: 0.07 X10(3) UL (ref 0–0.2)
BASOPHILS NFR BLD AUTO: 1 %
BILIRUB SERPL-MCNC: 0.6 MG/DL (ref 0.1–2)
BUN BLD-MCNC: 22 MG/DL (ref 7–18)
CALCIUM BLD-MCNC: 9.5 MG/DL (ref 8.5–10.1)
CHLORIDE SERPL-SCNC: 105 MMOL/L (ref 98–112)
CHOLEST SERPL-MCNC: 169 MG/DL (ref ?–200)
CO2 SERPL-SCNC: 28 MMOL/L (ref 21–32)
CREAT BLD-MCNC: 1.02 MG/DL
EOSINOPHIL # BLD AUTO: 0.12 X10(3) UL (ref 0–0.7)
EOSINOPHIL NFR BLD AUTO: 1.7 %
ERYTHROCYTE [DISTWIDTH] IN BLOOD BY AUTOMATED COUNT: 12.3 %
FASTING PATIENT LIPID ANSWER: YES
FASTING STATUS PATIENT QL REPORTED: YES
GLOBULIN PLAS-MCNC: 3.6 G/DL (ref 2.8–4.4)
GLUCOSE BLD-MCNC: 103 MG/DL (ref 70–99)
HCT VFR BLD AUTO: 47.5 %
HDLC SERPL-MCNC: 63 MG/DL (ref 40–59)
HGB BLD-MCNC: 15.2 G/DL
IMM GRANULOCYTES # BLD AUTO: 0.04 X10(3) UL (ref 0–1)
IMM GRANULOCYTES NFR BLD: 0.6 %
LDLC SERPL CALC-MCNC: 92 MG/DL (ref ?–100)
LYMPHOCYTES # BLD AUTO: 2.32 X10(3) UL (ref 1–4)
LYMPHOCYTES NFR BLD AUTO: 32.9 %
MCH RBC QN AUTO: 29.8 PG (ref 26–34)
MCHC RBC AUTO-ENTMCNC: 32 G/DL (ref 31–37)
MCV RBC AUTO: 93.1 FL
MONOCYTES # BLD AUTO: 0.55 X10(3) UL (ref 0.1–1)
MONOCYTES NFR BLD AUTO: 7.8 %
NEUTROPHILS # BLD AUTO: 3.96 X10 (3) UL (ref 1.5–7.7)
NEUTROPHILS # BLD AUTO: 3.96 X10(3) UL (ref 1.5–7.7)
NEUTROPHILS NFR BLD AUTO: 56 %
NONHDLC SERPL-MCNC: 106 MG/DL (ref ?–130)
OSMOLALITY SERPL CALC.SUM OF ELEC: 290 MOSM/KG (ref 275–295)
PLATELET # BLD AUTO: 221 10(3)UL (ref 150–450)
POTASSIUM SERPL-SCNC: 4.1 MMOL/L (ref 3.5–5.1)
PROT SERPL-MCNC: 7.5 G/DL (ref 6.4–8.2)
RBC # BLD AUTO: 5.1 X10(6)UL
SODIUM SERPL-SCNC: 138 MMOL/L (ref 136–145)
T4 FREE SERPL-MCNC: 1.6 NG/DL (ref 0.8–1.7)
TRIGL SERPL-MCNC: 74 MG/DL (ref 30–149)
TSI SER-ACNC: 0.17 MIU/ML (ref 0.36–3.74)
VIT D+METAB SERPL-MCNC: 55.7 NG/ML (ref 30–100)
VLDLC SERPL CALC-MCNC: 12 MG/DL (ref 0–30)
WBC # BLD AUTO: 7.1 X10(3) UL (ref 4–11)

## 2022-07-18 PROCEDURE — 85025 COMPLETE CBC W/AUTO DIFF WBC: CPT

## 2022-07-18 PROCEDURE — 84432 ASSAY OF THYROGLOBULIN: CPT

## 2022-07-18 PROCEDURE — 36415 COLL VENOUS BLD VENIPUNCTURE: CPT

## 2022-07-18 PROCEDURE — 80053 COMPREHEN METABOLIC PANEL: CPT

## 2022-07-18 PROCEDURE — 84439 ASSAY OF FREE THYROXINE: CPT

## 2022-07-18 PROCEDURE — 84443 ASSAY THYROID STIM HORMONE: CPT

## 2022-07-18 PROCEDURE — 82306 VITAMIN D 25 HYDROXY: CPT

## 2022-07-18 PROCEDURE — 80061 LIPID PANEL: CPT

## 2022-07-18 PROCEDURE — 86800 THYROGLOBULIN ANTIBODY: CPT

## 2022-07-21 LAB
THYROGLOBULIN AB: <0.9 IU/ML
THYROGLOBULIN, SERUM OR PLASMA: <0.1 NG/ML

## 2022-08-05 ENCOUNTER — OFFICE VISIT (OUTPATIENT)
Dept: INTERNAL MEDICINE CLINIC | Facility: CLINIC | Age: 72
End: 2022-08-05
Payer: MEDICARE

## 2022-08-05 VITALS
WEIGHT: 144.38 LBS | TEMPERATURE: 98 F | SYSTOLIC BLOOD PRESSURE: 120 MMHG | HEART RATE: 70 BPM | DIASTOLIC BLOOD PRESSURE: 80 MMHG | HEIGHT: 62.6 IN | RESPIRATION RATE: 18 BRPM | BODY MASS INDEX: 25.9 KG/M2

## 2022-08-05 DIAGNOSIS — Z00.00 ENCOUNTER FOR ANNUAL HEALTH EXAMINATION: Primary | ICD-10-CM

## 2022-08-05 DIAGNOSIS — E78.00 HYPERCHOLESTEROLEMIA: ICD-10-CM

## 2022-08-05 DIAGNOSIS — I10 ESSENTIAL (PRIMARY) HYPERTENSION: ICD-10-CM

## 2022-08-05 DIAGNOSIS — Z85.850 HISTORY OF THYROID CANCER: ICD-10-CM

## 2022-08-05 PROCEDURE — G0439 PPPS, SUBSEQ VISIT: HCPCS | Performed by: INTERNAL MEDICINE

## 2022-08-05 PROCEDURE — 1126F AMNT PAIN NOTED NONE PRSNT: CPT | Performed by: INTERNAL MEDICINE

## 2022-08-05 RX ORDER — PRAVASTATIN SODIUM 20 MG
20 TABLET ORAL NIGHTLY
Qty: 90 TABLET | Refills: 3 | Status: SHIPPED | OUTPATIENT
Start: 2022-08-05

## 2022-09-18 DIAGNOSIS — I10 ESSENTIAL HYPERTENSION: ICD-10-CM

## 2022-09-19 ENCOUNTER — PATIENT MESSAGE (OUTPATIENT)
Dept: INTERNAL MEDICINE CLINIC | Facility: CLINIC | Age: 72
End: 2022-09-19

## 2022-09-19 DIAGNOSIS — I10 ESSENTIAL HYPERTENSION: ICD-10-CM

## 2022-09-20 RX ORDER — AZITHROMYCIN 250 MG/1
TABLET, FILM COATED ORAL
Qty: 6 TABLET | Refills: 0 | Status: SHIPPED | OUTPATIENT
Start: 2022-09-20 | End: 2022-09-25

## 2022-09-20 RX ORDER — ENALAPRIL MALEATE 20 MG/1
20 TABLET ORAL DAILY
Qty: 90 TABLET | Refills: 0 | OUTPATIENT
Start: 2022-09-20

## 2022-09-20 RX ORDER — ENALAPRIL MALEATE 20 MG/1
TABLET ORAL
Qty: 90 TABLET | Refills: 0 | Status: SHIPPED | OUTPATIENT
Start: 2022-09-20

## 2022-12-19 DIAGNOSIS — I10 ESSENTIAL HYPERTENSION: ICD-10-CM

## 2022-12-20 RX ORDER — ENALAPRIL MALEATE 20 MG/1
TABLET ORAL
Qty: 90 TABLET | Refills: 0 | Status: SHIPPED | OUTPATIENT
Start: 2022-12-20

## 2023-01-30 ENCOUNTER — TELEPHONE (OUTPATIENT)
Dept: INTERNAL MEDICINE CLINIC | Facility: CLINIC | Age: 73
End: 2023-01-30

## 2023-02-13 ENCOUNTER — OFFICE VISIT (OUTPATIENT)
Dept: INTERNAL MEDICINE CLINIC | Facility: CLINIC | Age: 73
End: 2023-02-13
Payer: MEDICARE

## 2023-02-13 VITALS
OXYGEN SATURATION: 97 % | HEART RATE: 94 BPM | SYSTOLIC BLOOD PRESSURE: 126 MMHG | WEIGHT: 144 LBS | HEIGHT: 62.6 IN | RESPIRATION RATE: 18 BRPM | BODY MASS INDEX: 25.84 KG/M2 | DIASTOLIC BLOOD PRESSURE: 88 MMHG

## 2023-02-13 DIAGNOSIS — M62.838 MUSCLE SPASM: Primary | ICD-10-CM

## 2023-02-13 PROCEDURE — 99213 OFFICE O/P EST LOW 20 MIN: CPT | Performed by: INTERNAL MEDICINE

## 2023-02-13 RX ORDER — CYCLOBENZAPRINE HCL 5 MG
5 TABLET ORAL NIGHTLY PRN
Qty: 7 TABLET | Refills: 0 | Status: SHIPPED | OUTPATIENT
Start: 2023-02-13 | End: 2023-02-20

## 2023-02-14 ENCOUNTER — TELEPHONE (OUTPATIENT)
Dept: INTERNAL MEDICINE CLINIC | Facility: CLINIC | Age: 73
End: 2023-02-14

## 2023-02-14 NOTE — TELEPHONE ENCOUNTER
She is calling to provide info for us to update her file. She tried to do it online and was not able to.  Pt had her Esteban Chaudhari [id number] on 9/9/22 at [id number]    Also pt is only taking Synthroid 137mcg please update her chart

## 2023-02-14 NOTE — TELEPHONE ENCOUNTER
PSR:  Please call pt back to let her know her Medication List has been updated. We will need a copy of her COVID Vaccine Card to update her COVID Vaccine. Thank you.

## 2023-02-14 NOTE — TELEPHONE ENCOUNTER
LOV 8/5/22 with TB for a CPE. Copied notes: Return in about 6 months (around 2/5/2023) for follow up chronic issues. TB, do you want labs for the 6 month f/u?

## 2023-02-15 ENCOUNTER — TELEPHONE (OUTPATIENT)
Dept: INTERNAL MEDICINE CLINIC | Facility: CLINIC | Age: 73
End: 2023-02-15

## 2023-02-15 DIAGNOSIS — I10 ESSENTIAL HYPERTENSION: Primary | ICD-10-CM

## 2023-02-15 DIAGNOSIS — Z00.00 ENCOUNTER FOR ANNUAL HEALTH EXAMINATION: ICD-10-CM

## 2023-02-15 NOTE — TELEPHONE ENCOUNTER
Future Appointments   Date Time Provider Shane Lizbeth   8/3/2023 10:20 AM Robert Dougherty MD [facility]     Informed must fast no call back required.  Orders to [facility]

## 2023-03-13 DIAGNOSIS — I10 ESSENTIAL HYPERTENSION: ICD-10-CM

## 2023-03-14 RX ORDER — ENALAPRIL MALEATE 20 MG/1
20 TABLET ORAL DAILY
Qty: 90 TABLET | Refills: 0 | Status: SHIPPED | OUTPATIENT
Start: 2023-03-14

## 2023-05-17 ENCOUNTER — PATIENT MESSAGE (OUTPATIENT)
Dept: INTERNAL MEDICINE CLINIC | Facility: CLINIC | Age: 73
End: 2023-05-17

## 2023-05-17 NOTE — TELEPHONE ENCOUNTER
From: Vidhya Schofield  To: Rigo Grijalva MD  Sent: 5/17/2023 4:11 PM CDT  Subject: Disregard my previous letter. I just spoke to Dr. Марина Chinchilla office. They cannot get me an earlier appointment. I am going to call my previous dermatologist and make an appointment.  Thank you

## 2023-06-08 DIAGNOSIS — I10 ESSENTIAL HYPERTENSION: ICD-10-CM

## 2023-06-09 RX ORDER — ENALAPRIL MALEATE 20 MG/1
20 TABLET ORAL DAILY
Qty: 90 TABLET | Refills: 0 | Status: SHIPPED | OUTPATIENT
Start: 2023-06-09

## 2023-06-09 NOTE — TELEPHONE ENCOUNTER
Hypertension Medications Protocol Passed 06/08/2023 04:19 PM   Protocol Details  CMP or BMP in past 12 months    Last serum creatinine< 2.0    Appointment in past 6 or next 3 months        LOV 8/5/22 tb    LAST LAB  7/18/22    LAST RX 3/14/23 90     Next OV   Future Appointments   Date Time Provider Shane Nieves   8/3/2023 10:20 AM Tank Medina MD [facility]         PROTOCOL pass

## 2023-08-03 ENCOUNTER — OFFICE VISIT (OUTPATIENT)
Dept: INTERNAL MEDICINE CLINIC | Facility: CLINIC | Age: 73
End: 2023-08-03
Payer: MEDICARE

## 2023-08-03 VITALS
SYSTOLIC BLOOD PRESSURE: 138 MMHG | TEMPERATURE: 97 F | HEART RATE: 89 BPM | BODY MASS INDEX: 25.27 KG/M2 | WEIGHT: 142.63 LBS | RESPIRATION RATE: 18 BRPM | OXYGEN SATURATION: 97 % | HEIGHT: 63 IN | DIASTOLIC BLOOD PRESSURE: 82 MMHG

## 2023-08-03 DIAGNOSIS — Z00.00 ENCOUNTER FOR ANNUAL WELLNESS EXAM IN MEDICARE PATIENT: Primary | ICD-10-CM

## 2023-08-03 DIAGNOSIS — I10 ESSENTIAL HYPERTENSION: ICD-10-CM

## 2023-08-03 DIAGNOSIS — E78.00 HYPERCHOLESTEROLEMIA: ICD-10-CM

## 2023-08-03 DIAGNOSIS — E89.0 POST-SURGICAL HYPOTHYROIDISM: ICD-10-CM

## 2023-08-03 DIAGNOSIS — I25.10 CORONARY ARTERY DISEASE INVOLVING NATIVE CORONARY ARTERY OF NATIVE HEART WITHOUT ANGINA PECTORIS: ICD-10-CM

## 2023-08-03 DIAGNOSIS — Z12.31 ENCOUNTER FOR SCREENING MAMMOGRAM FOR MALIGNANT NEOPLASM OF BREAST: ICD-10-CM

## 2023-08-03 DIAGNOSIS — Z85.850 HISTORY OF THYROID CANCER: ICD-10-CM

## 2023-08-03 PROBLEM — E03.9 HYPOTHYROIDISM: Status: RESOLVED | Noted: 2017-05-30 | Resolved: 2023-08-03

## 2023-08-03 PROCEDURE — G0439 PPPS, SUBSEQ VISIT: HCPCS | Performed by: INTERNAL MEDICINE

## 2023-08-03 PROCEDURE — 1126F AMNT PAIN NOTED NONE PRSNT: CPT | Performed by: INTERNAL MEDICINE

## 2023-08-03 RX ORDER — PRAVASTATIN SODIUM 20 MG
20 TABLET ORAL NIGHTLY
Qty: 90 TABLET | Refills: 3 | Status: SHIPPED | OUTPATIENT
Start: 2023-08-03

## 2023-08-03 RX ORDER — LEVOTHYROXINE SODIUM 137 UG/1
137 TABLET ORAL
Qty: 90 TABLET | Refills: 0 | COMMUNITY
Start: 2023-08-03

## 2023-08-03 RX ORDER — ENALAPRIL MALEATE 20 MG/1
20 TABLET ORAL DAILY
Qty: 90 TABLET | Refills: 3 | Status: SHIPPED | OUTPATIENT
Start: 2023-08-03

## 2023-08-10 ENCOUNTER — LAB ENCOUNTER (OUTPATIENT)
Dept: LAB | Age: 73
End: 2023-08-10
Attending: INTERNAL MEDICINE
Payer: MEDICARE

## 2023-08-10 DIAGNOSIS — I10 ESSENTIAL HYPERTENSION: ICD-10-CM

## 2023-08-10 DIAGNOSIS — E89.0 POST-SURGICAL HYPOTHYROIDISM: ICD-10-CM

## 2023-08-10 DIAGNOSIS — Z85.850 HISTORY OF THYROID CANCER: ICD-10-CM

## 2023-08-10 DIAGNOSIS — Z00.00 ENCOUNTER FOR ANNUAL HEALTH EXAMINATION: ICD-10-CM

## 2023-08-10 LAB
ALBUMIN SERPL-MCNC: 4 G/DL (ref 3.4–5)
ALBUMIN/GLOB SERPL: 1.2 {RATIO} (ref 1–2)
ALP LIVER SERPL-CCNC: 64 U/L
ALT SERPL-CCNC: 28 U/L
ANION GAP SERPL CALC-SCNC: 6 MMOL/L (ref 0–18)
AST SERPL-CCNC: 17 U/L (ref 15–37)
BASOPHILS # BLD AUTO: 0.07 X10(3) UL (ref 0–0.2)
BASOPHILS NFR BLD AUTO: 0.9 %
BILIRUB SERPL-MCNC: 0.6 MG/DL (ref 0.1–2)
BUN BLD-MCNC: 20 MG/DL (ref 7–18)
CALCIUM BLD-MCNC: 8.6 MG/DL (ref 8.5–10.1)
CHLORIDE SERPL-SCNC: 107 MMOL/L (ref 98–112)
CHOLEST SERPL-MCNC: 180 MG/DL (ref ?–200)
CO2 SERPL-SCNC: 27 MMOL/L (ref 21–32)
CREAT BLD-MCNC: 0.75 MG/DL
EGFRCR SERPLBLD CKD-EPI 2021: 83 ML/MIN/1.73M2 (ref 60–?)
EOSINOPHIL # BLD AUTO: 0.19 X10(3) UL (ref 0–0.7)
EOSINOPHIL NFR BLD AUTO: 2.6 %
ERYTHROCYTE [DISTWIDTH] IN BLOOD BY AUTOMATED COUNT: 12.2 %
FASTING PATIENT LIPID ANSWER: YES
FASTING STATUS PATIENT QL REPORTED: YES
GLOBULIN PLAS-MCNC: 3.3 G/DL (ref 2.8–4.4)
GLUCOSE BLD-MCNC: 96 MG/DL (ref 70–99)
HCT VFR BLD AUTO: 49.1 %
HDLC SERPL-MCNC: 67 MG/DL (ref 40–59)
HGB BLD-MCNC: 15.6 G/DL
IMM GRANULOCYTES # BLD AUTO: 0.02 X10(3) UL (ref 0–1)
IMM GRANULOCYTES NFR BLD: 0.3 %
LDLC SERPL CALC-MCNC: 94 MG/DL (ref ?–100)
LYMPHOCYTES # BLD AUTO: 2.26 X10(3) UL (ref 1–4)
LYMPHOCYTES NFR BLD AUTO: 30.4 %
MCH RBC QN AUTO: 29.9 PG (ref 26–34)
MCHC RBC AUTO-ENTMCNC: 31.8 G/DL (ref 31–37)
MCV RBC AUTO: 94.1 FL
MONOCYTES # BLD AUTO: 0.55 X10(3) UL (ref 0.1–1)
MONOCYTES NFR BLD AUTO: 7.4 %
NEUTROPHILS # BLD AUTO: 4.35 X10 (3) UL (ref 1.5–7.7)
NEUTROPHILS # BLD AUTO: 4.35 X10(3) UL (ref 1.5–7.7)
NEUTROPHILS NFR BLD AUTO: 58.4 %
NONHDLC SERPL-MCNC: 113 MG/DL (ref ?–130)
OSMOLALITY SERPL CALC.SUM OF ELEC: 292 MOSM/KG (ref 275–295)
PLATELET # BLD AUTO: 244 10(3)UL (ref 150–450)
POTASSIUM SERPL-SCNC: 4.3 MMOL/L (ref 3.5–5.1)
PROT SERPL-MCNC: 7.3 G/DL (ref 6.4–8.2)
RBC # BLD AUTO: 5.22 X10(6)UL
SODIUM SERPL-SCNC: 140 MMOL/L (ref 136–145)
T3FREE SERPL-MCNC: 2.49 PG/ML (ref 2.4–4.2)
T4 FREE SERPL-MCNC: 1.3 NG/DL (ref 0.8–1.7)
TRIGL SERPL-MCNC: 108 MG/DL (ref 30–149)
TSI SER-ACNC: 0.34 MIU/ML (ref 0.36–3.74)
VLDLC SERPL CALC-MCNC: 18 MG/DL (ref 0–30)
WBC # BLD AUTO: 7.4 X10(3) UL (ref 4–11)

## 2023-08-10 PROCEDURE — 80053 COMPREHEN METABOLIC PANEL: CPT

## 2023-08-10 PROCEDURE — 86800 THYROGLOBULIN ANTIBODY: CPT

## 2023-08-10 PROCEDURE — 84439 ASSAY OF FREE THYROXINE: CPT

## 2023-08-10 PROCEDURE — 84481 FREE ASSAY (FT-3): CPT

## 2023-08-10 PROCEDURE — 36415 COLL VENOUS BLD VENIPUNCTURE: CPT

## 2023-08-10 PROCEDURE — 84443 ASSAY THYROID STIM HORMONE: CPT

## 2023-08-10 PROCEDURE — 80061 LIPID PANEL: CPT

## 2023-08-10 PROCEDURE — 85025 COMPLETE CBC W/AUTO DIFF WBC: CPT

## 2023-08-12 ENCOUNTER — TELEPHONE (OUTPATIENT)
Dept: INTERNAL MEDICINE CLINIC | Facility: CLINIC | Age: 73
End: 2023-08-12

## 2023-08-12 DIAGNOSIS — R71.8 ELEVATED HEMATOCRIT: Primary | ICD-10-CM

## 2023-08-14 LAB
THYROGLOB AB: <1 IU/ML
THYROGLOB IMA: <0.1 NG/ML

## 2023-08-17 ENCOUNTER — HOSPITAL ENCOUNTER (OUTPATIENT)
Dept: MAMMOGRAPHY | Facility: HOSPITAL | Age: 73
Discharge: HOME | End: 2023-08-17
Attending: INTERNAL MEDICINE
Payer: MEDICARE

## 2023-08-17 DIAGNOSIS — Z12.31 ENCOUNTER FOR SCREENING MAMMOGRAM FOR MALIGNANT NEOPLASM OF BREAST: ICD-10-CM

## 2023-08-17 PROCEDURE — 77067 SCR MAMMO BI INCL CAD: CPT | Performed by: INTERNAL MEDICINE

## 2023-08-17 PROCEDURE — 77063 BREAST TOMOSYNTHESIS BI: CPT | Performed by: INTERNAL MEDICINE

## 2023-09-27 ENCOUNTER — TELEPHONE (OUTPATIENT)
Dept: INTERNAL MEDICINE CLINIC | Facility: CLINIC | Age: 73
End: 2023-09-27

## 2023-09-27 NOTE — TELEPHONE ENCOUNTER
Pt was seen at [facility] and had skin cancer removal, Mother and father both passed of Melanoma. Her younger brother  this past  of Pancreatic cancer. She was told there was a relationship between the two Pancreatic Cancer and Melanoma she need to see a  specialist. Pt prefers someone from [place] since [place] closer [place]. Looking for recommendations.  Pt has medicare and bcbs ppo

## 2023-09-27 NOTE — TELEPHONE ENCOUNTER
[id number] Carolina Patel with me but I do not know any of the  there, she will need to give us a name to place referral

## 2023-10-09 ENCOUNTER — LAB ENCOUNTER (OUTPATIENT)
Dept: LAB | Age: 73
End: 2023-10-09
Attending: INTERNAL MEDICINE
Payer: MEDICARE

## 2023-10-09 DIAGNOSIS — C73 THYROID CANCER (HCC): ICD-10-CM

## 2023-10-09 DIAGNOSIS — E89.0 POSTSURGICAL HYPOTHYROIDISM: Primary | ICD-10-CM

## 2023-10-09 DIAGNOSIS — R71.8 ELEVATED HEMATOCRIT: ICD-10-CM

## 2023-10-09 LAB
BASOPHILS # BLD AUTO: 0.08 X10(3) UL (ref 0–0.2)
BASOPHILS NFR BLD AUTO: 0.9 %
EOSINOPHIL # BLD AUTO: 0.26 X10(3) UL (ref 0–0.7)
EOSINOPHIL NFR BLD AUTO: 2.9 %
ERYTHROCYTE [DISTWIDTH] IN BLOOD BY AUTOMATED COUNT: 12.4 %
HCT VFR BLD AUTO: 45 %
HGB BLD-MCNC: 14.4 G/DL
IMM GRANULOCYTES # BLD AUTO: 0.05 X10(3) UL (ref 0–1)
IMM GRANULOCYTES NFR BLD: 0.6 %
LYMPHOCYTES # BLD AUTO: 2.98 X10(3) UL (ref 1–4)
LYMPHOCYTES NFR BLD AUTO: 32.9 %
MCH RBC QN AUTO: 29.6 PG (ref 26–34)
MCHC RBC AUTO-ENTMCNC: 32 G/DL (ref 31–37)
MCV RBC AUTO: 92.6 FL
MONOCYTES # BLD AUTO: 0.76 X10(3) UL (ref 0.1–1)
MONOCYTES NFR BLD AUTO: 8.4 %
NEUTROPHILS # BLD AUTO: 4.93 X10 (3) UL (ref 1.5–7.7)
NEUTROPHILS # BLD AUTO: 4.93 X10(3) UL (ref 1.5–7.7)
NEUTROPHILS NFR BLD AUTO: 54.3 %
PLATELET # BLD AUTO: 230 10(3)UL (ref 150–450)
RBC # BLD AUTO: 4.86 X10(6)UL
T4 FREE SERPL-MCNC: 1.1 NG/DL (ref 0.8–1.7)
TSI SER-ACNC: 0.66 MIU/ML (ref 0.36–3.74)
WBC # BLD AUTO: 9.1 X10(3) UL (ref 4–11)

## 2023-10-09 PROCEDURE — 84443 ASSAY THYROID STIM HORMONE: CPT

## 2023-10-09 PROCEDURE — 84439 ASSAY OF FREE THYROXINE: CPT

## 2023-10-09 PROCEDURE — 36415 COLL VENOUS BLD VENIPUNCTURE: CPT

## 2023-10-09 PROCEDURE — 85025 COMPLETE CBC W/AUTO DIFF WBC: CPT

## 2023-10-17 ENCOUNTER — OFFICE VISIT (OUTPATIENT)
Dept: INTERNAL MEDICINE CLINIC | Facility: CLINIC | Age: 73
End: 2023-10-17
Payer: MEDICARE

## 2023-10-17 VITALS
RESPIRATION RATE: 16 BRPM | SYSTOLIC BLOOD PRESSURE: 136 MMHG | OXYGEN SATURATION: 99 % | WEIGHT: 147 LBS | TEMPERATURE: 98 F | HEART RATE: 98 BPM | DIASTOLIC BLOOD PRESSURE: 78 MMHG | HEIGHT: 63 IN | BODY MASS INDEX: 26.05 KG/M2

## 2023-10-17 DIAGNOSIS — R09.81 NASAL CONGESTION: ICD-10-CM

## 2023-10-17 DIAGNOSIS — J02.9 SORE THROAT: Primary | ICD-10-CM

## 2023-10-17 PROCEDURE — 87637 SARSCOV2&INF A&B&RSV AMP PRB: CPT | Performed by: INTERNAL MEDICINE

## 2023-10-17 PROCEDURE — 99213 OFFICE O/P EST LOW 20 MIN: CPT | Performed by: INTERNAL MEDICINE

## 2023-10-18 LAB
FLUAV + FLUBV RNA SPEC NAA+PROBE: NOT DETECTED
FLUAV + FLUBV RNA SPEC NAA+PROBE: NOT DETECTED
RSV RNA SPEC NAA+PROBE: NOT DETECTED
SARS-COV-2 RNA RESP QL NAA+PROBE: NOT DETECTED

## 2023-10-20 ENCOUNTER — TELEPHONE (OUTPATIENT)
Dept: INTERNAL MEDICINE CLINIC | Facility: CLINIC | Age: 73
End: 2023-10-20

## 2023-10-20 RX ORDER — AMOXICILLIN AND CLAVULANATE POTASSIUM 875; 125 MG/1; MG/1
1 TABLET, FILM COATED ORAL 2 TIMES DAILY
Qty: 20 TABLET | Refills: 0 | Status: SHIPPED | OUTPATIENT
Start: 2023-10-20 | End: 2023-10-30

## 2023-10-20 NOTE — TELEPHONE ENCOUNTER
Patient calling seen by TB for congestion and it is not getting better.  TB asked pt to call back if not better and would give antibiotic Augmentin.   Patient will be flying out of town tomorrow morning.    [id number] - [place] - [address] [phone], [phone]

## 2023-10-20 NOTE — TELEPHONE ENCOUNTER
Called and spoke w/ pt. Notified TB sent Augmentin to pharmacy. Pt verbalizes understanding. Appreciative of order.

## 2023-10-30 LAB
CONTROL LINE PRESENT WITH A CLEAR BACKGROUND (YES/NO): YES YES/NO
KIT LOT #: NORMAL NUMERIC
STREP GRP A CUL-SCR: NEGATIVE

## 2024-01-30 ENCOUNTER — OFFICE VISIT (OUTPATIENT)
Dept: INTERNAL MEDICINE CLINIC | Facility: CLINIC | Age: 74
End: 2024-01-30
Payer: MEDICARE

## 2024-01-30 VITALS
RESPIRATION RATE: 18 BRPM | SYSTOLIC BLOOD PRESSURE: 136 MMHG | OXYGEN SATURATION: 98 % | HEIGHT: 63 IN | BODY MASS INDEX: 26.37 KG/M2 | DIASTOLIC BLOOD PRESSURE: 80 MMHG | TEMPERATURE: 97 F | WEIGHT: 148.81 LBS | HEART RATE: 80 BPM

## 2024-01-30 DIAGNOSIS — R09.89 LABILE BLOOD PRESSURE: ICD-10-CM

## 2024-01-30 DIAGNOSIS — Z85.850 HISTORY OF THYROID CANCER: ICD-10-CM

## 2024-01-30 DIAGNOSIS — R42 DIZZINESS: Primary | ICD-10-CM

## 2024-01-30 DIAGNOSIS — H53.9 VISION CHANGES: ICD-10-CM

## 2024-01-30 DIAGNOSIS — I10 ESSENTIAL (PRIMARY) HYPERTENSION: ICD-10-CM

## 2024-01-30 PROCEDURE — 99214 OFFICE O/P EST MOD 30 MIN: CPT | Performed by: INTERNAL MEDICINE

## 2024-01-30 RX ORDER — AMLODIPINE BESYLATE 5 MG/1
5 TABLET ORAL DAILY
Qty: 90 TABLET | Refills: 1 | Status: SHIPPED | OUTPATIENT
Start: 2024-01-30 | End: 2025-01-24

## 2024-01-30 NOTE — PROGRESS NOTES
Nan King is a 73 year old female.    Chief Complaint   Patient presents with    Blood Pressure     EJ RM 3- Pt is here for a bp f/u    Dizziness    Vision Problem       HPI:     Patient with HTN, HL, mild CAD per heart scan, H/o thyroid cancer s/p thyroidectomy/post operative hypothyroidism here for follow up.  C/o dizziness with visual changes (everything was moving up and down for 5 minutes) on 1/22/24. She was just watching TV with her  when this happened. She could not even walk due to vision changes. She denies any f/c/CP/palp/SOB/HA/weakness/numbness/tingling. Episode lasted 5 min and resolved on its own. 2 days later she went to her optho for eye check up and she was told eyes are not the problem and that she needs a vascular work up. She started checking her BP since then and it fluctuates, today best 136/80. Home -150s/70-90. She is currently taking enalapril 20mg daily.          Patient Active Problem List   Diagnosis    Essential hypertension    Hypercholesterolemia    Dry eyes    History of thyroid cancer    Pattern dystrophy of macula    Coronary artery disease involving native coronary artery without angina pectoris    Post-surgical hypothyroidism     Current Outpatient Medications   Medication Sig Dispense Refill    amLODIPine 5 MG Oral Tab Take 1 tablet (5 mg total) by mouth daily. 90 tablet 1    enalapril 20 MG Oral Tab Take 1 tablet (20 mg total) by mouth daily. 90 tablet 3    pravastatin 20 MG Oral Tab Take 1 tablet (20 mg total) by mouth nightly. 90 tablet 3    levothyroxine (SYNTHROID) 137 MCG Oral Tab Take 137 mcg by mouth before breakfast. 90 tablet 0    fluorouracil 5 % External Cream Apply 1 Application topically 2 (two) times daily as needed.      Cholecalciferol (VITAMIN D3) 1000 units Oral Cap Take 1 tablet by mouth daily.      Glucosamine-Chondroitin (GLUCOSAMINE CHONDR COMPLEX OR) Take by mouth 2 (two) times daily.        cycloSPORINE 0.05 % Ophthalmic Emulsion 1  drop 2 (two) times daily.      ASPIR-81 81 MG OR TBEC Take 1 tablet (81 mg total) by mouth in the morning and 1 tablet (81 mg total) before bedtime.      CALCIUM + D OR 2 tablets PO QD      FIBERCON OR QD      MULTI-VITAMIN/MINERALS OR TABS 1 TABLET DAILY      levothyroxine 137 MCG Oral Tab Take 137 mcg by mouth before breakfast.  0    FISH OIL 3 capsules daily        Past Medical History:   Diagnosis Date    Herpes zoster     High blood pressure     High cholesterol     Unspecified disorder of thyroid       Social History:  Social History     Socioeconomic History    Marital status:    Occupational History    Occupation:  center owner, grandmother     Employer: [employer]   Tobacco Use    Smoking status: Never    Smokeless tobacco: Never   Vaping Use    Vaping Use: Never used   Substance and Sexual Activity    Alcohol use: Yes     Alcohol/week: 0.0 standard drinks of alcohol     Comment: cage 19    Drug use: No   Other Topics Concern    Exercise Yes     Comment: off and on   Social History Narrative        5 children-healthy    Nonsmoker    1-2 drinks/night    No drugs    Retired -ran  center     Family History   Problem Relation Age of Onset    Cancer Father         melanoma  at 83    Heart Disorder Father         CABG in 50's    Heart Surgery Father 50        CABG    Heart Disorder Mother         supraventricular tachycardia    Breast Cancer Mother         still alive in     Pacemaker Mother         \"cardiac pacemaker programming and iterative adjustment courtney-procedure\"    Heart Surgery Mother         pacemaker    Arrhythmia Mother     Other (Melanoma) Mother          in  at 92 from metastatic melanoma     Cancer Brother         prostate        Allergies  Allergies   Allergen Reactions    Sulfa Drugs Cross Reactors UNKNOWN         REVIEW OF SYSTEMS:   GENERAL HEALTH:  no fevers   RESPIRATORY: no cough  CARDIOVASCULAR: denies chest pain  GI: denies abdominal  pain  : no dysuria  NEURO: as above  PSYCH: No reported depression   HEME: No adenopathy      EXAM:   /80   Pulse 80   Temp 97 °F (36.1 °C) (Temporal)   Resp 18   Ht 5' 3\" (1.6 m)   Wt 148 lb 12.8 oz (67.5 kg)   LMP  (LMP Unknown)   SpO2 98%   BMI 26.36 kg/m²   GENERAL: well developed, well nourished,in no apparent distress  HEENT: atraumatic, normocephalic, PERRL, EOMI  NECK: supple,no adenopathy, bruits not appreciated  LUNGS: normal rate without respiratory distress, lungs clear to auscultation  CARDIO: RRR nl S1 S2  EXTREMITIES: no cyanosis, clubbing or edema  NEURO: Alert and oriented, no focal deficits    ASSESSMENT AND PLAN:     Encounter Diagnoses   Name     Dizziness- symptoms resolved, will check CT head to rule out IC abn with h/o vision changes with the dizziness and her personal h/o cancer     Vision changes- eye exam recently wnl. Discussed if vision changes recur, she should go to ER. Would be concerned for TIA/stroke     Labile blood pressure- home readings reviewed and generally on the high side, add amlodipine 5mg daily to enalapril. Monitor BP     Essential (primary) hypertension- as above     History of thyroid cancer        No orders of the defined types were placed in this encounter.      Meds & Refills for this Visit:  Requested Prescriptions     Signed Prescriptions Disp Refills    amLODIPine 5 MG Oral Tab 90 tablet 1     Sig: Take 1 tablet (5 mg total) by mouth daily.       Imaging & Consults:  US CAROTID DOPPLER BILAT - DIAG IMG (CPT=93880)  CT BRAIN OR HEAD (17041)  CARD ECHO 2D DOPPLER (CPT=93306)    Return in about 6 weeks (around 3/12/2024), or if symptoms worsen or fail to improve, for follow up BP, echo, etc (ok to use SDA spot for extended appointment).  There are no Patient Instructions on file for this visit.      The patient indicates understanding of these issues and agrees to the plan.

## 2024-02-01 ENCOUNTER — HOSPITAL ENCOUNTER (OUTPATIENT)
Dept: CV DIAGNOSTICS | Facility: HOSPITAL | Age: 74
Discharge: HOME | End: 2024-02-01
Attending: INTERNAL MEDICINE
Payer: MEDICARE

## 2024-02-01 ENCOUNTER — HOSPITAL ENCOUNTER (OUTPATIENT)
Dept: CT IMAGING | Facility: HOSPITAL | Age: 74
Discharge: HOME | End: 2024-02-01
Attending: INTERNAL MEDICINE
Payer: MEDICARE

## 2024-02-01 DIAGNOSIS — Z85.850 HISTORY OF THYROID CANCER: ICD-10-CM

## 2024-02-01 DIAGNOSIS — I10 ESSENTIAL (PRIMARY) HYPERTENSION: ICD-10-CM

## 2024-02-01 DIAGNOSIS — H53.9 VISION CHANGES: ICD-10-CM

## 2024-02-01 DIAGNOSIS — R42 DIZZINESS: ICD-10-CM

## 2024-02-01 DIAGNOSIS — R09.89 LABILE BLOOD PRESSURE: ICD-10-CM

## 2024-02-01 PROCEDURE — 70450 CT HEAD/BRAIN W/O DYE: CPT | Performed by: INTERNAL MEDICINE

## 2024-02-01 PROCEDURE — 93306 TTE W/DOPPLER COMPLETE: CPT | Performed by: INTERNAL MEDICINE

## 2024-02-02 ENCOUNTER — HOSPITAL ENCOUNTER (OUTPATIENT)
Dept: ULTRASOUND IMAGING | Facility: HOSPITAL | Age: 74
Discharge: HOME | End: 2024-02-02
Attending: INTERNAL MEDICINE
Payer: MEDICARE

## 2024-02-02 DIAGNOSIS — Z85.850 HISTORY OF THYROID CANCER: ICD-10-CM

## 2024-02-02 DIAGNOSIS — R42 DIZZINESS: ICD-10-CM

## 2024-02-02 DIAGNOSIS — H53.9 VISION CHANGES: ICD-10-CM

## 2024-02-02 PROCEDURE — 93880 EXTRACRANIAL BILAT STUDY: CPT | Performed by: INTERNAL MEDICINE

## 2024-03-11 ENCOUNTER — OFFICE VISIT (OUTPATIENT)
Dept: INTERNAL MEDICINE CLINIC | Facility: CLINIC | Age: 74
End: 2024-03-11
Payer: MEDICARE

## 2024-03-11 VITALS
OXYGEN SATURATION: 98 % | HEIGHT: 63.39 IN | WEIGHT: 147 LBS | SYSTOLIC BLOOD PRESSURE: 139 MMHG | RESPIRATION RATE: 16 BRPM | DIASTOLIC BLOOD PRESSURE: 62 MMHG | TEMPERATURE: 99 F | HEART RATE: 94 BPM | BODY MASS INDEX: 25.72 KG/M2

## 2024-03-11 DIAGNOSIS — E89.0 POST-SURGICAL HYPOTHYROIDISM: ICD-10-CM

## 2024-03-11 DIAGNOSIS — R42 DIZZINESS: Primary | ICD-10-CM

## 2024-03-11 DIAGNOSIS — Z87.39 HISTORY OF BACK PAIN: ICD-10-CM

## 2024-03-11 DIAGNOSIS — Z13.828 SCOLIOSIS CONCERN: ICD-10-CM

## 2024-03-11 DIAGNOSIS — I10 ESSENTIAL (PRIMARY) HYPERTENSION: ICD-10-CM

## 2024-03-11 PROCEDURE — 99214 OFFICE O/P EST MOD 30 MIN: CPT | Performed by: INTERNAL MEDICINE

## 2024-03-11 PROCEDURE — G2211 COMPLEX E/M VISIT ADD ON: HCPCS | Performed by: INTERNAL MEDICINE

## 2024-03-11 RX ORDER — LEVOTHYROXINE SODIUM 0.12 MG/1
125 TABLET ORAL
COMMUNITY
Start: 2024-03-11

## 2024-03-11 RX ORDER — AMLODIPINE BESYLATE 10 MG/1
10 TABLET ORAL DAILY
Qty: 90 TABLET | Refills: 1 | Status: SHIPPED | OUTPATIENT
Start: 2024-03-11

## 2024-03-11 NOTE — PROGRESS NOTES
Nan King is a 73 year old female.    Chief Complaint   Patient presents with    Follow - Up     ES rm - 3 - f/u for BP, imaging and Card echo  Referral for specialist for back pain  Thyroid medication concern       HPI:   Patient with HTN, HL, mild CAD per heart scan, H/o thyroid cancer s/p thyroidectomy/post operative hypothyroidism, episode of dizziness with visual changes on 1/22/24 here for follow up.  No more episodes of DZ/vision problems. CT brain and echo unremarkable.   BP still running higher than we would like, best 139/62 today. Home BP best was 129/60s but many readings into the 140s systolic. No SE on amlodipine 5mg daily, would like to try the 10mg dose. Already taking enalapril 20mg daily.  She has scoliosis and intermittent LBP, interested in seeing a physician for advise on exercises/stretching to prevent more problems in the future.   She has h/o thyroid cancer and switching to new Endo, currently on levothyroxine 125mcg, will likely need one refill to hold her over to appt with new endo    Patient Active Problem List   Diagnosis    Essential hypertension    Hypercholesterolemia    Dry eyes    History of thyroid cancer    Pattern dystrophy of macula    Coronary artery disease involving native coronary artery without angina pectoris    Post-surgical hypothyroidism     Current Outpatient Medications   Medication Sig Dispense Refill    levothyroxine 125 MCG Oral Tab Take 1 tablet (125 mcg total) by mouth before breakfast.      amLODIPine 10 MG Oral Tab Take 1 tablet (10 mg total) by mouth daily. 90 tablet 1    enalapril 20 MG Oral Tab Take 1 tablet (20 mg total) by mouth daily. 90 tablet 3    pravastatin 20 MG Oral Tab Take 1 tablet (20 mg total) by mouth nightly. 90 tablet 3    fluorouracil 5 % External Cream Apply 1 Application topically 2 (two) times daily as needed.      Cholecalciferol (VITAMIN D3) 1000 units Oral Cap Take 1 tablet by mouth daily.      Glucosamine-Chondroitin  (GLUCOSAMINE CHONDR COMPLEX OR) Take by mouth 2 (two) times daily.        cycloSPORINE 0.05 % Ophthalmic Emulsion 1 drop 2 (two) times daily.      ASPIR-81 81 MG OR TBEC Take 1 tablet (81 mg total) by mouth in the morning and 1 tablet (81 mg total) before bedtime.      CALCIUM + D OR 2 tablets PO QD      FIBERCON OR QD      MULTI-VITAMIN/MINERALS OR TABS 1 TABLET DAILY      FISH OIL 3 capsules daily (Patient not taking: Reported on 3/11/2024)        Past Medical History:   Diagnosis Date    Herpes zoster     High blood pressure     High cholesterol     Unspecified disorder of thyroid       Social History:  Social History     Socioeconomic History    Marital status:    Occupational History    Occupation:  center owner, grandmother     Employer: [employer]   Tobacco Use    Smoking status: Never    Smokeless tobacco: Never   Vaping Use    Vaping Use: Never used   Substance and Sexual Activity    Alcohol use: Yes     Alcohol/week: 0.0 standard drinks of alcohol     Comment: cage 19    Drug use: No   Other Topics Concern    Exercise Yes     Comment: off and on   Social History Narrative        5 children-healthy    Nonsmoker    1-2 drinks/night    No drugs    Retired -ran  center     Family History   Problem Relation Age of Onset    Cancer Father         melanoma  at 83    Heart Disorder Father         CABG in 50's    Heart Surgery Father 50        CABG    Heart Disorder Mother         supraventricular tachycardia    Breast Cancer Mother         still alive in     Pacemaker Mother         \"cardiac pacemaker programming and iterative adjustment courtney-procedure\"    Heart Surgery Mother         pacemaker    Arrhythmia Mother     Other (Melanoma) Mother          in  at 92 from metastatic melanoma     Cancer Brother         prostate        Allergies  Allergies   Allergen Reactions    Sulfa Drugs Cross Reactors UNKNOWN         REVIEW OF SYSTEMS:   GENERAL HEALTH:  no fevers    RESPIRATORY: no cough  CARDIOVASCULAR: denies chest pain  GI: denies abdominal pain  : no dysuria  NEURO: denies headaches, no episodes of dizziness or vision changes  MS: occ LBP  PSYCH: No reported depression   HEME: No adenopathy      EXAM:   /62   Pulse 94   Temp 98.6 °F (37 °C) (Temporal)   Resp 16   Ht 5' 3.39\" (1.61 m)   Wt 147 lb (66.7 kg)   LMP  (LMP Unknown)   SpO2 98%   BMI 25.72 kg/m²   GENERAL: well developed, well nourished,in no apparent distress  HEENT: atraumatic, normocephalic  NECK: supple,no adenopathy  LUNGS: normal rate without respiratory distress, lungs clear to auscultation  CARDIO: RRR nl S1 S2  GI: normal bowel sounds, soft, NT/ND  EXTREMITIES: no cyanosis, clubbing or edema  NEURO: Alert and oriented, no focal deficits    ASSESSMENT AND PLAN:     Encounter Diagnoses   Name     Dizziness- resolved, ?etiology. CT brain, echo and carotid ok. Will get opinion from neurology     Essential (primary) hypertension- not optimal, increase amlodipine to 10mg daily. Monitor for ankle edema. Monitor BP     Scoliosis concern- referred to physiatry      History of back pain- as above, can use otc tylenol if needed     Post-surgical hypothyroidism- stable on levothyroxine 125mcg daily, CPM. She will establish with new endocrinologist soon.        No orders of the defined types were placed in this encounter.      Meds & Refills for this Visit:  Requested Prescriptions     Signed Prescriptions Disp Refills    amLODIPine 10 MG Oral Tab 90 tablet 1     Sig: Take 1 tablet (10 mg total) by mouth daily.       Imaging & Consults:  NEURO - INTERNAL  PHYSIATRY - INTERNAL    Return in about 3 months (around 6/11/2024), or if symptoms worsen or fail to improve, for follow up on BP.  There are no Patient Instructions on file for this visit.      The patient indicates understanding of these issues and agrees to the plan.

## 2024-03-26 ENCOUNTER — MED REC SCAN ONLY (OUTPATIENT)
Dept: INTERNAL MEDICINE CLINIC | Facility: CLINIC | Age: 74
End: 2024-03-26

## 2024-04-18 ENCOUNTER — OFFICE VISIT (OUTPATIENT)
Dept: INTERNAL MEDICINE CLINIC | Facility: CLINIC | Age: 74
End: 2024-04-18
Payer: MEDICARE

## 2024-04-18 VITALS
SYSTOLIC BLOOD PRESSURE: 134 MMHG | WEIGHT: 143 LBS | HEIGHT: 63.39 IN | RESPIRATION RATE: 18 BRPM | TEMPERATURE: 98 F | HEART RATE: 90 BPM | BODY MASS INDEX: 25.02 KG/M2 | DIASTOLIC BLOOD PRESSURE: 82 MMHG | OXYGEN SATURATION: 99 %

## 2024-04-18 DIAGNOSIS — R00.0 TACHYCARDIA: Primary | ICD-10-CM

## 2024-04-18 DIAGNOSIS — I10 ESSENTIAL HYPERTENSION: ICD-10-CM

## 2024-04-18 LAB
ATRIAL RATE: 93 BPM
P AXIS: 60 DEGREES
P-R INTERVAL: 146 MS
Q-T INTERVAL: 356 MS
QRS DURATION: 80 MS
QTC CALCULATION (BEZET): 442 MS
R AXIS: 5 DEGREES
T AXIS: 61 DEGREES
VENTRICULAR RATE: 93 BPM

## 2024-04-18 PROCEDURE — 93000 ELECTROCARDIOGRAM COMPLETE: CPT | Performed by: INTERNAL MEDICINE

## 2024-04-18 PROCEDURE — 99213 OFFICE O/P EST LOW 20 MIN: CPT | Performed by: INTERNAL MEDICINE

## 2024-04-18 NOTE — PROGRESS NOTES
Nan King is a 73 year old female.    Chief Complaint   Patient presents with    Follow - Up     ES rm - 3 - f/u for TB       HPI:     Pleasant patient with HTN and HL here for follow up. Last OV, BP was running high so amlodipine was increased to 10mg and enalapril 20mg was continued. Patient had LE edema on amlodipine so she stopped taking it. She monitored BP at home on enalapril 20mg alone and BP WNL.  HR sometimes into the 100s but with no symptoms of palpitations or CP.    Average HR in the 90s today and EKG WNL. She gets very nervous with BP check in office (likely white coat syndrome),  home BP readings wnl.  No CP/palp/SOB.           Patient Active Problem List   Diagnosis    Essential hypertension    Hypercholesterolemia    Dry eyes    History of thyroid cancer    Pattern dystrophy of macula    Coronary artery disease involving native coronary artery without angina pectoris    Post-surgical hypothyroidism     Current Outpatient Medications   Medication Sig Dispense Refill    levothyroxine 125 MCG Oral Tab Take 1 tablet (125 mcg total) by mouth before breakfast.      enalapril 20 MG Oral Tab Take 1 tablet (20 mg total) by mouth daily. 90 tablet 3    pravastatin 20 MG Oral Tab Take 1 tablet (20 mg total) by mouth nightly. 90 tablet 3    fluorouracil 5 % External Cream Apply 1 Application topically 2 (two) times daily as needed.      Cholecalciferol (VITAMIN D3) 1000 units Oral Cap Take 1 tablet by mouth daily.      Glucosamine-Chondroitin (GLUCOSAMINE CHONDR COMPLEX OR) Take by mouth 2 (two) times daily.        cycloSPORINE 0.05 % Ophthalmic Emulsion 1 drop 2 (two) times daily.      ASPIR-81 81 MG OR TBEC Take 1 tablet (81 mg total) by mouth in the morning and 1 tablet (81 mg total) before bedtime.      CALCIUM + D OR 2 tablets PO QD      FIBERCON OR QD      MULTI-VITAMIN/MINERALS OR TABS 1 TABLET DAILY      FISH OIL         Past Medical History:    Herpes zoster    High blood pressure    High  cholesterol    Unspecified disorder of thyroid      Social History:  Social History     Socioeconomic History    Marital status:    Occupational History    Occupation:  center owner, grandmother     Employer: [employer]   Tobacco Use    Smoking status: Never    Smokeless tobacco: Never   Vaping Use    Vaping status: Never Used   Substance and Sexual Activity    Alcohol use: Yes     Alcohol/week: 0.0 standard drinks of alcohol     Comment: cage 19    Drug use: No   Other Topics Concern    Exercise Yes     Comment: off and on   Social History Narrative        5 children-healthy    Nonsmoker    1-2 drinks/night    No drugs    Retired -ran  center     Family History   Problem Relation Age of Onset    Cancer Father         melanoma  at 83    Heart Disorder Father         CABG in 50's    Heart Surgery Father 50        CABG    Heart Disorder Mother         supraventricular tachycardia    Breast Cancer Mother         still alive in     Pacemaker Mother         \"cardiac pacemaker programming and iterative adjustment courtney-procedure\"    Heart Surgery Mother         pacemaker    Arrhythmia Mother     Other (Melanoma) Mother          in  at 92 from metastatic melanoma     Cancer Brother         prostate        Allergies  Allergies   Allergen Reactions    Sulfa Drugs Cross Reactors UNKNOWN         REVIEW OF SYSTEMS:   GENERAL HEALTH:  no fevers   RESPIRATORY: no cough  CARDIOVASCULAR: denies chest pain  GI: denies abdominal pain         EXAM:   /82   Pulse 90   Temp 97.7 °F (36.5 °C) (Temporal)   Resp 18   Ht 5' 3.39\" (1.61 m)   Wt 143 lb (64.9 kg)   LMP  (LMP Unknown)   SpO2 99%   BMI 25.02 kg/m²   GENERAL: well developed, NAD  LUNGS: normal rate without respiratory distress, lungs clear to auscultation  CARDIO: RRR nl S1 S2  GI: normal bowel sounds, soft, NT/ND  EXTREMITIES: no cyanosis, clubbing or edema  NEURO: Alert and oriented    EKG- NSR 93 bpm, nl axis and  intervals, no acute ST - T wave changes ,  normal EKG    ASSESSMENT AND PLAN:     Encounter Diagnoses   Name     Tachycardia- resolved, EKG normal and recent echo normal. TSH end of last year wnl. Observation for now     Essential hypertension, white coat syndrome- continue enalapril 20mg daily, monitor BP at home        No orders of the defined types were placed in this encounter.      Meds & Refills for this Visit:  Requested Prescriptions      No prescriptions requested or ordered in this encounter       Imaging & Consults:  ELECTROCARDIOGRAM, COMPLETE    No follow-ups on file.  There are no Patient Instructions on file for this visit.      The patient indicates understanding of these issues and agrees to the plan.

## 2024-07-25 DIAGNOSIS — I10 ESSENTIAL HYPERTENSION: ICD-10-CM

## 2024-07-29 RX ORDER — ENALAPRIL MALEATE 20 MG/1
20 TABLET ORAL DAILY
Qty: 90 TABLET | Refills: 3 | Status: SHIPPED | OUTPATIENT
Start: 2024-07-29

## 2024-07-29 NOTE — TELEPHONE ENCOUNTER
Refill Per Protocol     Requested Prescriptions   Pending Prescriptions Disp Refills    ENALAPRIL 20 MG Oral Tab [Pharmacy Med Name: Enalapril Maleate 20 Mg Tab Nort] 90 tablet 0     Sig: Take 1 tablet (20 mg total) by mouth daily.       Hypertension Medications Protocol Passed - 7/25/2024  3:00 AM        Passed - CMP or BMP in past 12 months        Passed - Last BP reading less than 140/90     BP Readings from Last 1 Encounters:   04/18/24 134/82               Passed - In person appointment or virtual visit in the past 12 mos or appointment in next 3 mos     Recent Outpatient Visits              3 months ago [address]achycardia    [address]    Office Visit    4 months ago Dizziness    [facility] [address] [place], [place] [address]Manny Tina, MD    Office Visit    6 months ago Dizziness    [address]    Office Visit    9 months ago Sore throat    [address]    Office Visit    12 months ago Encounter for annual wellness exam in Medicare patient    [address]    [address]          Future Appointments         Provider Department Appt Notes    In 3 weeks Elaina Kay MD [place] [address] last 8/23                    Passed - EGFRCR or GFRNAA > 50     GFR Evaluation  EGFRCR: 83 , resulted on 8/10/2023                 Future Appointments         Provider Department Appt Notes    In 3 weeks Elaina Kay MD [place] [address] last 8/23          Recent Outpatient Visits              3 months ago Tachycardia    [address]    Office Visit    4 months ago Dizziness    [address]  Elaina Garsia MD    Office Visit    6 months ago [place]izziness    [facility] [address] [place]    [place]    9 months ago Sore throat    [facility], [address] [place], [place] [address]Manny Tina, MD    Office Visit    12 months ago Encounter for annual wellness exam in Medicare patient    [facility], [address] [place], Elaina Garsia MD    Office Visit

## 2024-08-06 ENCOUNTER — TELEPHONE (OUTPATIENT)
Dept: INTERNAL MEDICINE CLINIC | Facility: CLINIC | Age: 74
End: 2024-08-06

## 2024-08-06 DIAGNOSIS — Z12.31 ENCOUNTER FOR SCREENING MAMMOGRAM FOR MALIGNANT NEOPLASM OF BREAST: Primary | ICD-10-CM

## 2024-08-06 NOTE — TELEPHONE ENCOUNTER
Received call from pt requesting mammogram order. Scheduled for annual 8/22/24.     Last mammo 8/17/23:    Impression   CONCLUSION:       BI-RADS CATEGORY:    DIAGNOSTIC CATEGORY 1--NEGATIVE NO CHANGE FROM COMPARISON ASSESSMENT.       RECOMMENDATIONS:    ROUTINE MAMMOGRAM AND CLINICAL EVALUATION IN 12 MONTHS.       Order placed per protocol. Central scheduling number given to pt. Patient appreciative.

## 2024-08-12 DIAGNOSIS — Z00.00 ENCOUNTER FOR ANNUAL WELLNESS EXAM IN MEDICARE PATIENT: ICD-10-CM

## 2024-08-12 DIAGNOSIS — I10 ESSENTIAL HYPERTENSION: Primary | ICD-10-CM

## 2024-08-12 DIAGNOSIS — E78.00 HYPERCHOLESTEROLEMIA: ICD-10-CM

## 2024-08-17 ENCOUNTER — LAB ENCOUNTER (OUTPATIENT)
Dept: LAB | Facility: HOSPITAL | Age: 74
End: 2024-08-17
Attending: INTERNAL MEDICINE
Payer: MEDICARE

## 2024-08-17 DIAGNOSIS — E78.00 HYPERCHOLESTEROLEMIA: ICD-10-CM

## 2024-08-17 DIAGNOSIS — I10 ESSENTIAL HYPERTENSION: ICD-10-CM

## 2024-08-17 DIAGNOSIS — Z00.00 ENCOUNTER FOR ANNUAL WELLNESS EXAM IN MEDICARE PATIENT: ICD-10-CM

## 2024-08-17 LAB
ALBUMIN SERPL-MCNC: 5 G/DL (ref 3.2–4.8)
ALBUMIN/GLOB SERPL: 2.1 {RATIO} (ref 1–2)
ALP LIVER SERPL-CCNC: 70 U/L
ALT SERPL-CCNC: 23 U/L
ANION GAP SERPL CALC-SCNC: 6 MMOL/L (ref 0–18)
AST SERPL-CCNC: 25 U/L (ref ?–34)
BASOPHILS # BLD AUTO: 0.06 X10(3) UL (ref 0–0.2)
BASOPHILS NFR BLD AUTO: 0.7 %
BILIRUB SERPL-MCNC: 0.8 MG/DL (ref 0.2–1.1)
BUN BLD-MCNC: 23 MG/DL (ref 9–23)
CALCIUM BLD-MCNC: 9.9 MG/DL (ref 8.7–10.4)
CHLORIDE SERPL-SCNC: 104 MMOL/L (ref 98–112)
CHOLEST SERPL-MCNC: 177 MG/DL (ref ?–200)
CO2 SERPL-SCNC: 29 MMOL/L (ref 21–32)
CREAT BLD-MCNC: 0.82 MG/DL
EGFRCR SERPLBLD CKD-EPI 2021: 74 ML/MIN/1.73M2 (ref 60–?)
EOSINOPHIL # BLD AUTO: 0.25 X10(3) UL (ref 0–0.7)
EOSINOPHIL NFR BLD AUTO: 2.9 %
ERYTHROCYTE [DISTWIDTH] IN BLOOD BY AUTOMATED COUNT: 12 %
FASTING PATIENT LIPID ANSWER: YES
FASTING STATUS PATIENT QL REPORTED: YES
GLOBULIN PLAS-MCNC: 2.4 G/DL (ref 2–3.5)
GLUCOSE BLD-MCNC: 96 MG/DL (ref 70–99)
HCT VFR BLD AUTO: 46 %
HDLC SERPL-MCNC: 52 MG/DL (ref 40–59)
HGB BLD-MCNC: 15.1 G/DL
IMM GRANULOCYTES # BLD AUTO: 0.02 X10(3) UL (ref 0–1)
IMM GRANULOCYTES NFR BLD: 0.2 %
LDLC SERPL CALC-MCNC: 103 MG/DL (ref ?–100)
LYMPHOCYTES # BLD AUTO: 2.6 X10(3) UL (ref 1–4)
LYMPHOCYTES NFR BLD AUTO: 29.6 %
MCH RBC QN AUTO: 29.6 PG (ref 26–34)
MCHC RBC AUTO-ENTMCNC: 32.8 G/DL (ref 31–37)
MCV RBC AUTO: 90.2 FL
MONOCYTES # BLD AUTO: 0.73 X10(3) UL (ref 0.1–1)
MONOCYTES NFR BLD AUTO: 8.3 %
NEUTROPHILS # BLD AUTO: 5.11 X10 (3) UL (ref 1.5–7.7)
NEUTROPHILS # BLD AUTO: 5.11 X10(3) UL (ref 1.5–7.7)
NEUTROPHILS NFR BLD AUTO: 58.3 %
NONHDLC SERPL-MCNC: 125 MG/DL (ref ?–130)
OSMOLALITY SERPL CALC.SUM OF ELEC: 292 MOSM/KG (ref 275–295)
PLATELET # BLD AUTO: 236 10(3)UL (ref 150–450)
POTASSIUM SERPL-SCNC: 4.6 MMOL/L (ref 3.5–5.1)
PROT SERPL-MCNC: 7.4 G/DL (ref 5.7–8.2)
RBC # BLD AUTO: 5.1 X10(6)UL
SODIUM SERPL-SCNC: 139 MMOL/L (ref 136–145)
T4 FREE SERPL-MCNC: 1.8 NG/DL (ref 0.8–1.7)
TRIGL SERPL-MCNC: 126 MG/DL (ref 30–149)
TSI SER-ACNC: 0.23 MIU/ML (ref 0.55–4.78)
VLDLC SERPL CALC-MCNC: 21 MG/DL (ref 0–30)
WBC # BLD AUTO: 8.8 X10(3) UL (ref 4–11)

## 2024-08-17 PROCEDURE — 84443 ASSAY THYROID STIM HORMONE: CPT

## 2024-08-17 PROCEDURE — 80061 LIPID PANEL: CPT

## 2024-08-17 PROCEDURE — 80053 COMPREHEN METABOLIC PANEL: CPT

## 2024-08-17 PROCEDURE — 36415 COLL VENOUS BLD VENIPUNCTURE: CPT

## 2024-08-17 PROCEDURE — 84439 ASSAY OF FREE THYROXINE: CPT

## 2024-08-17 PROCEDURE — 85025 COMPLETE CBC W/AUTO DIFF WBC: CPT

## 2024-08-22 ENCOUNTER — OFFICE VISIT (OUTPATIENT)
Dept: INTERNAL MEDICINE CLINIC | Facility: CLINIC | Age: 74
End: 2024-08-22
Payer: MEDICARE

## 2024-08-22 VITALS
WEIGHT: 146 LBS | HEART RATE: 99 BPM | RESPIRATION RATE: 18 BRPM | BODY MASS INDEX: 25.55 KG/M2 | HEIGHT: 63.39 IN | TEMPERATURE: 98 F | SYSTOLIC BLOOD PRESSURE: 130 MMHG | OXYGEN SATURATION: 97 % | DIASTOLIC BLOOD PRESSURE: 80 MMHG

## 2024-08-22 DIAGNOSIS — Z78.0 POST-MENOPAUSAL: ICD-10-CM

## 2024-08-22 DIAGNOSIS — Z00.00 ENCOUNTER FOR MEDICARE ANNUAL WELLNESS EXAM: Primary | ICD-10-CM

## 2024-08-22 DIAGNOSIS — E78.00 HYPERCHOLESTEROLEMIA: ICD-10-CM

## 2024-08-22 DIAGNOSIS — I10 ESSENTIAL HYPERTENSION: ICD-10-CM

## 2024-08-22 DIAGNOSIS — R93.1 ABNORMAL CT SCAN OF HEART: ICD-10-CM

## 2024-08-22 DIAGNOSIS — E89.0 POST-SURGICAL HYPOTHYROIDISM: ICD-10-CM

## 2024-08-22 PROBLEM — I25.10 CORONARY ARTERY DISEASE INVOLVING NATIVE CORONARY ARTERY WITHOUT ANGINA PECTORIS: Status: RESOLVED | Noted: 2018-04-24 | Resolved: 2024-08-22

## 2024-08-22 PROCEDURE — G0439 PPPS, SUBSEQ VISIT: HCPCS | Performed by: INTERNAL MEDICINE

## 2024-08-22 RX ORDER — PRAVASTATIN SODIUM 20 MG
20 TABLET ORAL NIGHTLY
Qty: 90 TABLET | Refills: 3 | Status: SHIPPED | OUTPATIENT
Start: 2024-08-22

## 2024-08-22 RX ORDER — NEOMYCIN SULFATE, POLYMYXIN B SULFATE AND DEXAMETHASONE 3.5; 10000; 1 MG/ML; [USP'U]/ML; MG/ML
SUSPENSION/ DROPS OPHTHALMIC
COMMUNITY
End: 2024-08-22 | Stop reason: ALTCHOICE

## 2024-08-22 NOTE — PROGRESS NOTES
Subjective:   Nan King is a 74 year old female who presents for a Medicare Subsequent Annual Wellness visit (Pt already had Initial Annual Wellness) and scheduled follow up of multiple significant but stable problems.      1. Encounter for Medicare annual wellness exam (Primary)- referred for dexa and mammogram, she is up to date on cscope. She will consider covid 19 booster and flu vaccine in the fall  2. Post-surgical hypothyroidism, h/o thyroid cancer- managed by Oksana Cook  3. Hypercholesterolemia- continue heart healthy diet and pravastatin 20mg daily  -     Pravastatin Sodium; Take 1 tablet (20 mg total) by mouth nightly.  Dispense: 90 tablet; Refill: 3  4. Post-menopausal  -     XR DEXA BONE DENSITOMETRY (CPT=77080); Future; Expected date: 08/22/2024  5. Abnormal CT scan of heart (score of 300 in 2017)- continue statin therapy, heart healthy diet and regular exercise  6. Essential hypertension- controlled, CPM     History/Other:   Fall Risk Assessment:   She has been screened for Falls and is low risk.      Cognitive Assessment:   She had a completely normal cognitive assessment - see flowsheet entries     Functional Ability/Status:   Nan King has some abnormal functions as listed below:  She has Vision problems based on screening of functional status.       Depression Screening (PHQ):  PHQ-2 SCORE: 0  , done 8/22/2024   Last [place] Suicide Screening on 8/22/2024 was No Risk.         Advanced Directives:   She does have a Living Will but we do NOT have it on file in Epic.    She does have a POA but we do NOT have it on file in Epic.    Not discussed      Patient Active Problem List   Diagnosis    Essential hypertension    Hypercholesterolemia    Dry eyes    History of thyroid cancer    Pattern dystrophy of macula    Post-surgical hypothyroidism    Abnormal CT scan of heart     Allergies:  She is allergic to sulfa drugs cross reactors.    Current Medications:  Outpatient Medications Marked  as Taking for the 24 encounter (Office Visit) with Elaina Kay MD   Medication Sig    pravastatin 20 MG Oral Tab Take 1 tablet (20 mg total) by mouth nightly.    enalapril 20 MG Oral Tab Take 1 tablet (20 mg total) by mouth daily.    levothyroxine 125 MCG Oral Tab Take 1 tablet (125 mcg total) by mouth before breakfast.    Cholecalciferol (VITAMIN D3) 1000 units Oral Cap Take 1 tablet by mouth daily.    Glucosamine-Chondroitin (GLUCOSAMINE CHONDR COMPLEX OR) Take by mouth 2 (two) times daily.      cycloSPORINE 0.05 % Ophthalmic Emulsion 1 drop 2 (two) times daily.    ASPIR-81 81 MG OR TBEC Take 1 tablet (81 mg total) by mouth in the morning and 1 tablet (81 mg total) before bedtime.    CALCIUM + D OR 2 tablets PO QD    FIBERCON OR QD    MULTI-VITAMIN/MINERALS OR TABS 1 TABLET DAILY       Medical History:  She  has a past medical history of Herpes zoster, High blood pressure, High cholesterol, and Unspecified disorder of thyroid.  Surgical History:  She  has a past surgical history that includes hysterectomy (); ; colonoscopy (, ); thyroidectomy (); colonoscopy (N/A, 3/5/2015); and other surgical history (2020).   Family History:  Her family history includes Arrhythmia in her mother; Breast Cancer in her mother; Cancer in her brother and father; Heart Disorder in her father and mother; Heart Surgery in her mother; Heart Surgery (age of onset: 50) in her father; Melanoma in her mother; Pacemaker in her mother.  Social History:  She  reports that she has never smoked. She has never used smokeless tobacco. She reports current alcohol use. She reports that she does not use drugs.    Tobacco:  She has never smoked tobacco.    CAGE Alcohol Screen:   CAGE screening score of 0 on 2024, showing low risk of alcohol abuse.      Patient Care Team:  Elaina Kay MD as PCP - General (Internal Medicine)  Junior Morley MD (OPHTHALMOLOGY)  David Dumont NP (Nurse  Practitioner)  Andreas Jasso DPM (PODIATRIST)  Alpa Almendarez APRN (Nurse Practitioner Acute Care)    Review of Systems     Negative for f/c/CP/palp/SOB    Objective:   Physical Exam  General Appearance:  Alert, cooperative, no distress, appears stated age   Head:  Normocephalic, without obvious abnormality, atraumatic   Eyes:  PERRL, conjunctiva/corneas clear, EOM's intact both eyes   Ears:  Normal TM's and external ear canals, both ears   Nose: Nares normal, septum midline,mucosa normal   Throat: Lips, mucosa, tongue normal   Neck: Supple, symmetrical, trachea midline, no JVD   Back:   Symmetric, no curvature, ROM normal, no CVA tenderness   Lungs:   Clear to auscultation bilaterally, respirations unlabored   Heart:  Regular rate and rhythm, S1 and S2 normal   Abdomen:   Soft, non-tender, ND, nl BS   Pelvic: Deferred   Extremities: Extremities normal, atraumatic, no cyanosis or edema   Pulses: 2+ and symmetric           Neurologic: Alert and oriented       /80   Pulse 99   Temp 98.4 °F (36.9 °C)   Resp 18   Ht 5' 3.39\" (1.61 m)   Wt 146 lb (66.2 kg)   LMP  (LMP Unknown)   SpO2 97%   BMI 25.55 kg/m²  Estimated body mass index is 25.55 kg/m² as calculated from the following:    Height as of this encounter: 5' 3.39\" (1.61 m).    Weight as of this encounter: 146 lb (66.2 kg).    Medicare Hearing Assessment:   Finger rub wnl          Assessment & Plan:   Nan King is a 74 year old female who presents for a Medicare Assessment.     1. Encounter for Medicare annual wellness exam (Primary)- referred for dexa and mammogram, she is up to date on cscope. She will consider covid 19 booster and flu vaccine in the fall  2. Post-surgical hypothyroidism, h/o thyroid cancer- managed by Oksana Cook  3. Hypercholesterolemia- continue heart healthy diet and pravastatin 20mg daily  -     Pravastatin Sodium; Take 1 tablet (20 mg total) by mouth nightly.  Dispense: 90 tablet; Refill: 3  4.  Post-menopausal  -     XR DEXA BONE DENSITOMETRY (CPT=77080); Future; Expected date: 08/22/2024  5. Abnormal CT scan of heart (score of 300 in 2017)- continue statin therapy, heart healthy diet and regular exercise  6. Essential hypertension- controlled, CPM    The patient indicates understanding of these issues and agrees to the plan.  Continue with current treatment plan.  Reinforced healthy diet, lifestyle, and exercise.      Return in about 1 year (around 8/22/2025), or if symptoms worsen or fail to improve, for wellness.     Elaina Kay MD, 8/22/2024     Supplementary Documentation:   General Health:  In the past six months, have you lost more than 10 pounds without trying?: 2 - No  Has your appetite been poor?: No  Type of Diet: Balanced  How does the patient maintain a good energy level?: Appropriate Exercise  How would you describe your daily physical activity?: Moderate  How would you describe your current health state?: Good  How do you maintain positive mental well-being?: Social Interaction;Visiting Family  On a scale of 0 to 10, with 0 being no pain and 10 being severe pain, what is your pain level?: 2 - (Mild)  In the past six months, have you experienced urine leakage?: 1-Yes  At any time do you feel concerned for the safety/well-being of yourself and/or your children, in your home or elsewhere?: No  Have you had any immunizations at another office such as Influenza, Hepatitis B, Tetanus, or Pneumococcal?: No    Health Maintenance   Topic Date Due    COVID-19 Vaccine (8 - 2023-24 season) Refer to guidelines    Annual Physical  08/03/2024    Influenza Vaccine (1) 10/01/2024    DEXA Scan  Completed    Annual Depression Screening  Completed    Fall Risk Screening (Annual)  Completed    Pneumococcal Vaccine: 65+ Years  Completed    Zoster Vaccines  Completed    Colorectal Cancer Screening  Discontinued    Mammogram  Discontinued

## 2024-09-17 ENCOUNTER — HOSPITAL ENCOUNTER (OUTPATIENT)
Dept: GENERAL RADIOLOGY | Age: 74
Discharge: HOME | End: 2024-09-17
Attending: NURSE PRACTITIONER
Payer: MEDICARE

## 2024-09-17 ENCOUNTER — OFFICE VISIT (OUTPATIENT)
Dept: INTERNAL MEDICINE CLINIC | Facility: CLINIC | Age: 74
End: 2024-09-17
Payer: MEDICARE

## 2024-09-17 VITALS
OXYGEN SATURATION: 98 % | HEIGHT: 63 IN | DIASTOLIC BLOOD PRESSURE: 86 MMHG | SYSTOLIC BLOOD PRESSURE: 138 MMHG | RESPIRATION RATE: 14 BRPM | BODY MASS INDEX: 25.34 KG/M2 | HEART RATE: 97 BPM | TEMPERATURE: 98 F | WEIGHT: 143 LBS

## 2024-09-17 DIAGNOSIS — R26.81 UNSTEADY GAIT: ICD-10-CM

## 2024-09-17 DIAGNOSIS — I10 ESSENTIAL HYPERTENSION: ICD-10-CM

## 2024-09-17 DIAGNOSIS — Q74.0 CLAVICULAR ASYMMETRY: Primary | ICD-10-CM

## 2024-09-17 DIAGNOSIS — Z85.850 HISTORY OF THYROID CANCER: ICD-10-CM

## 2024-09-17 DIAGNOSIS — Q74.0 CLAVICULAR ASYMMETRY: ICD-10-CM

## 2024-09-17 DIAGNOSIS — E89.0 POST-SURGICAL HYPOTHYROIDISM: ICD-10-CM

## 2024-09-17 DIAGNOSIS — H53.9 VISION CHANGES: ICD-10-CM

## 2024-09-17 PROCEDURE — G2211 COMPLEX E/M VISIT ADD ON: HCPCS | Performed by: NURSE PRACTITIONER

## 2024-09-17 PROCEDURE — 71130 X-RAY STRENOCLAVIC JT 3/>VWS: CPT | Performed by: NURSE PRACTITIONER

## 2024-09-17 PROCEDURE — 99214 OFFICE O/P EST MOD 30 MIN: CPT | Performed by: NURSE PRACTITIONER

## 2024-09-17 RX ORDER — LEVOTHYROXINE SODIUM 112 MCG
112 TABLET ORAL DAILY
COMMUNITY
Start: 2024-09-04

## 2024-09-17 RX ORDER — LOSARTAN POTASSIUM 50 MG/1
50 TABLET ORAL DAILY
Qty: 90 TABLET | Refills: 0 | Status: SHIPPED | OUTPATIENT
Start: 2024-09-17

## 2024-09-17 NOTE — PROGRESS NOTES
Nan King is a 74 year old female.    Chief Complaint   Patient presents with    Bump     Pt noticed bump on R side of collar bone.  Denies pain and does not notice any increase in size.       HPI:   Here for a few issues.  Note of lump on her right clavicle.  She states it is new.  No pain  no notable trauma or injury.  No recent weight loss.  No redness or swelling.      Episode yesterday while working on her Ipad of sudden onset when she was looking up with movement of the room.  Describes as the room moving up and down not spinning. \"Jumping\"  Feeling unsettled but not necessarily dizzy.  Unsteady gait.   Episode lasted about 5-10 min then resolved.  Previous episode in Feb 2024.  CT brain , carotid dopplers, ECHO completed.   She takes baby asa daily    August with low TSH.  Following with Endo due to hx thyroid cancer.  She was decreased to levothyroxine 112mcg.  She has not decreased this dose.  She continues on levothyroxine 125mcg.   She is going on a long hiking trip and is concerned to change.  She agrees to alternating doses w Endo f/u       HTN  bp borderline.  Enalapril 20mg.  She is occasionally checking bp at home.  Has been running high  she has been talking to her sister who is an NP and suggested she change her meds.  Will try losartan  has tried amlodipine in the past but increased LE edema.  Will start losartan 50mg and can up titrate if needed to 100mg.  Message readings.     Patient Active Problem List   Diagnosis    Essential hypertension    Hypercholesterolemia    Dry eyes    History of thyroid cancer    Pattern dystrophy of macula    Post-surgical hypothyroidism    Abnormal CT scan of heart     Current Outpatient Medications   Medication Sig Dispense Refill    losartan 50 MG Oral Tab Take 1 tablet (50 mg total) by mouth daily. 90 tablet 0    pravastatin 20 MG Oral Tab Take 1 tablet (20 mg total) by mouth nightly. 90 tablet 3    enalapril 20 MG Oral Tab Take 1 tablet (20 mg total) by  mouth daily. 90 tablet 3    levothyroxine 125 MCG Oral Tab Take 1 tablet (125 mcg total) by mouth before breakfast.      Cholecalciferol (VITAMIN D3) 1000 units Oral Cap Take 1 tablet by mouth daily.      Glucosamine-Chondroitin (GLUCOSAMINE CHONDR COMPLEX OR) Take by mouth 2 (two) times daily.        cycloSPORINE 0.05 % Ophthalmic Emulsion 1 drop 2 (two) times daily.      ASPIR-81 81 MG OR TBEC Take 1 tablet (81 mg total) by mouth daily.      CALCIUM + D OR 2 tablets PO QD      FIBERCON OR QD      MULTI-VITAMIN/MINERALS OR TABS 1 TABLET DAILY      SYNTHROID 112 MCG Oral Tab Take 1 tablet (112 mcg total) by mouth daily. (Patient not taking: Reported on 2024)        Past Medical History:    Herpes zoster    High blood pressure    High cholesterol    Unspecified disorder of thyroid      Social History:  Social History     Socioeconomic History    Marital status:    Occupational History    Occupation:  center owner, grandmother     Employer: [employer]   Tobacco Use    Smoking status: Never    Smokeless tobacco: Never   Vaping Use    Vaping status: Never Used   Substance and Sexual Activity    Alcohol use: Yes     Alcohol/week: 0.0 standard drinks of alcohol     Comment: cage 19    Drug use: No   Other Topics Concern    Exercise Yes     Comment: off and on   Social History Narrative        5 children-healthy    Nonsmoker    1-2 drinks/night    No drugs    Retired -ran  center     Family History   Problem Relation Age of Onset    Cancer Father         melanoma  at 83    Heart Disorder Father         CABG in 50's    Heart Surgery Father 50        CABG    Heart Disorder Mother         supraventricular tachycardia    Breast Cancer Mother         still alive in     Pacemaker Mother         \"cardiac pacemaker programming and iterative adjustment courtney-procedure\"    Heart Surgery Mother         pacemaker    Arrhythmia Mother     Other (Melanoma) Mother          in  at 92  from metastatic melanoma     Cancer Brother         prostate        Allergies  Allergies   Allergen Reactions    Sulfa Drugs Cross Reactors UNKNOWN       REVIEW OF SYSTEMS:   GENERAL HEALTH:as above   RESPIRATORY: denies shortness of breath with exertion, no cough  CARDIOVASCULAR: denies chest pain on exertion, no palpatations  GI: denies abdominal pain and denies heartburn, no diarrhea or constipation  MUSCULOSKELETAL:  No arthralgias or myalgias  NEURO: as above   no headaches      EXAM:   /86   Pulse 97   Temp 98.2 °F (36.8 °C) (Temporal)   Resp 14   Ht 5' 3\" (1.6 m)   Wt 143 lb (64.9 kg)   LMP  (LMP Unknown)   SpO2 98%   BMI 25.33 kg/m²   GENERAL: well developed, well nourished,in no apparent distress  right sternoclavicular joint with prominence.  Firm  no swelling or erythema,  no movement of prominence or joint space.    LUNGS: normal rate without respiratory distress, lungs clear to auscultation  CARDIO: RRR without murmur  GI: normal bowel sounds, no masses, HSM or tenderness  EXTREMITIES: no edema, normal strength and tone  PSYCH: alert and oriented x 3    ASSESSMENT AND PLAN:     Encounter Diagnoses   Name Primary?    Clavicular asymmetry  check xray Yes    Vision changes  MRI brain with neuro follow up   continue statin and continue ASA.       Unsteady gait     Essential hypertension  change enalapril to losartan   monitor bp at home andsend readings.  May need to titrate to 100mg      Post-surgical hypothyroidism she agrees to alternate dose of 112 and 125mcg  she will contact Endo.       History of thyroid cancer        No orders of the defined types were placed in this encounter.      Meds & Refills for this Visit:  Requested Prescriptions     Signed Prescriptions Disp Refills    losartan 50 MG Oral Tab 90 tablet 0     Sig: Take 1 tablet (50 mg total) by mouth daily.       Imaging & Consults:  NEURO - INTERNAL  MRI BRAIN (CPT=70551)    No follow-ups on file.  There are no Patient  Instructions on file for this visit.

## 2024-09-18 ENCOUNTER — HOSPITAL ENCOUNTER (OUTPATIENT)
Dept: MAMMOGRAPHY | Facility: HOSPITAL | Age: 74
Discharge: HOME | End: 2024-09-18
Attending: INTERNAL MEDICINE
Payer: MEDICARE

## 2024-09-18 DIAGNOSIS — Z12.31 ENCOUNTER FOR SCREENING MAMMOGRAM FOR MALIGNANT NEOPLASM OF BREAST: ICD-10-CM

## 2024-09-18 PROCEDURE — 77063 BREAST TOMOSYNTHESIS BI: CPT | Performed by: INTERNAL MEDICINE

## 2024-09-18 PROCEDURE — 77067 SCR MAMMO BI INCL CAD: CPT | Performed by: INTERNAL MEDICINE

## 2024-09-30 ENCOUNTER — TELEPHONE (OUTPATIENT)
Dept: INTERNAL MEDICINE CLINIC | Facility: CLINIC | Age: 74
End: 2024-09-30

## 2024-09-30 RX ORDER — LOSARTAN POTASSIUM 100 MG/1
100 TABLET ORAL DAILY
Qty: 30 TABLET | Refills: 1 | Status: SHIPPED | OUTPATIENT
Start: 2024-09-30

## 2024-09-30 NOTE — TELEPHONE ENCOUNTER
Called and spoke w/ pt. Notified TB sent in Losartan 100mg to pharmacy. Advised to cont taking BP and keep record and f/u in 2-4 weeks. Pt stated she doesn't have room to pack her BP cuff. Advised if she develops symptoms while on trip should seek care where she is for BP eval. Went over warning symptoms and ER precautions. Pt verbalizes understanding and is agreeable to plan. F/u apt scheduled.     Future Appointments   Date Time Provider Department Center   10/14/2024 11:20 AM Elaina Kay MD [facility]   10/16/2024  1:45 PM  MR [facility] (3T WIDE)  [facility]   12/4/2024  2:15 PM Roselyn Sewell DO [facility]

## 2024-09-30 NOTE — TELEPHONE ENCOUNTER
Pt called stating her BP medications were adjusted at last ov on 09/17 and now BP is elevated. Denies symptoms. BP readings are below:    Sunday: 161/85 HR: 114  Sunday at 9pm: 147/79 HR: 106  Monday: 166/97 HR: 88  Pt states she was informed by ALONDRA Yanez that her medication can be increased and just to call office. Pt requesting for new dosage of medication to be sent to [place] in [place]. Pt is anxious as she is leaving for [place] tomorrow morning. Please advise.   (ALONDRA Yanez out of the office this week)

## 2024-10-16 ENCOUNTER — HOSPITAL ENCOUNTER (OUTPATIENT)
Dept: MRI IMAGING | Facility: HOSPITAL | Age: 74
Discharge: HOME | End: 2024-10-16
Attending: NURSE PRACTITIONER
Payer: MEDICARE

## 2024-10-16 DIAGNOSIS — H53.9 VISION CHANGES: ICD-10-CM

## 2024-10-16 DIAGNOSIS — R26.81 UNSTEADY GAIT: ICD-10-CM

## 2024-10-16 PROCEDURE — 70551 MRI BRAIN STEM W/O DYE: CPT | Performed by: NURSE PRACTITIONER

## 2024-10-21 ENCOUNTER — OFFICE VISIT (OUTPATIENT)
Dept: INTERNAL MEDICINE CLINIC | Facility: CLINIC | Age: 74
End: 2024-10-21
Payer: MEDICARE

## 2024-10-21 VITALS
TEMPERATURE: 97 F | RESPIRATION RATE: 18 BRPM | BODY MASS INDEX: 26.05 KG/M2 | HEART RATE: 100 BPM | HEIGHT: 62.99 IN | WEIGHT: 147 LBS | DIASTOLIC BLOOD PRESSURE: 84 MMHG | SYSTOLIC BLOOD PRESSURE: 144 MMHG

## 2024-10-21 DIAGNOSIS — I10 ESSENTIAL HYPERTENSION: Primary | ICD-10-CM

## 2024-10-21 PROCEDURE — 99213 OFFICE O/P EST LOW 20 MIN: CPT | Performed by: INTERNAL MEDICINE

## 2024-10-21 RX ORDER — LOSARTAN POTASSIUM AND HYDROCHLOROTHIAZIDE 25; 100 MG/1; MG/1
1 TABLET ORAL DAILY
Qty: 90 TABLET | Refills: 1 | Status: SHIPPED | OUTPATIENT
Start: 2024-10-21 | End: 2025-10-16

## 2024-10-21 NOTE — PROGRESS NOTES
Nan King is a 74 year old female.    Chief Complaint   Patient presents with    Hypertension     Folllow up       HPI:     Patient here for BP follow up-  She saw SD on 9/17, started with losartan 50mg and then increased to 100mg daily dose due to uncontrolled HTN. Her recent home BP readings 156/89. 156/94. 152/89. She has no symptoms of CP/HA. She is  anxious to see her numbers so high. BP taken several times in the clinic and lowest was 144/84  She has taken amlodipine in the past with LE edema. She is willing to add diuretic to losartan. Only allergy is SULFA    Patient Active Problem List   Diagnosis    Essential hypertension    Hypercholesterolemia    Dry eyes    History of thyroid cancer    Pattern dystrophy of macula    Post-surgical hypothyroidism    Abnormal CT scan of heart     Current Outpatient Medications   Medication Sig Dispense Refill    losartan-hydroCHLOROthiazide 100-25 MG Oral Tab Take 1 tablet by mouth daily. 90 tablet 1    SYNTHROID 112 MCG Oral Tab Take 1 tablet (112 mcg total) by mouth daily.      pravastatin 20 MG Oral Tab Take 1 tablet (20 mg total) by mouth nightly. 90 tablet 3    Cholecalciferol (VITAMIN D3) 1000 units Oral Cap Take 1 tablet by mouth daily.      Glucosamine-Chondroitin (GLUCOSAMINE CHONDR COMPLEX OR) Take by mouth 2 (two) times daily.        cycloSPORINE 0.05 % Ophthalmic Emulsion 1 drop 2 (two) times daily.      ASPIR-81 81 MG OR TBEC Take 1 tablet (81 mg total) by mouth daily.      CALCIUM + D OR 2 tablets PO QD      FIBERCON OR QD      MULTI-VITAMIN/MINERALS OR TABS 1 TABLET DAILY      levothyroxine 125 MCG Oral Tab Take 1 tablet (125 mcg total) by mouth before breakfast.        Past Medical History:    Herpes zoster    High blood pressure    High cholesterol    Unspecified disorder of thyroid      Social History:  Social History     Socioeconomic History    Marital status:    Occupational History    Occupation:  center owner, grandmother      Employer: [employer]   Tobacco Use    Smoking status: Never    Smokeless tobacco: Never   Vaping Use    Vaping status: Never Used   Substance and Sexual Activity    Alcohol use: Yes     Alcohol/week: 0.0 standard drinks of alcohol     Comment: cage 19    Drug use: No   Other Topics Concern    Exercise Yes     Comment: off and on   Social History Narrative        5 children-healthy    Nonsmoker    1-2 drinks/night    No drugs    Retired -ran  center     Family History   Problem Relation Age of Onset    Cancer Father         melanoma  at 83    Heart Disorder Father         CABG in 50's    Heart Surgery Father 50        CABG    Heart Disorder Mother         supraventricular tachycardia    Breast Cancer Mother         still alive in     Pacemaker Mother         \"cardiac pacemaker programming and iterative adjustment courtney-procedure\"    Heart Surgery Mother         pacemaker    Arrhythmia Mother     Other (Melanoma) Mother          in  at 92 from metastatic melanoma     Cancer Brother         prostate        Allergies  Allergies[1]      REVIEW OF SYSTEMS:   GENERAL HEALTH:  no fevers   RESPIRATORY: no cough  CARDIOVASCULAR: denies chest pain        EXAM:   /84   Pulse 100   Temp 97.2 °F (36.2 °C) (Temporal)   Resp 18   Ht 5' 2.99\" (1.6 m)   Wt 147 lb (66.7 kg)   LMP  (LMP Unknown)   BMI 26.05 kg/m²   GENERAL: well developed, NAD  NECK: supple,no adenopathy  LUNGS: normal rate without respiratory distress, lungs clear to auscultation  CARDIO: RRR nl S1 S2  EXTREMITIES: no cyanosis, clubbing or edema  NEURO: Alert and oriented    ASSESSMENT AND PLAN:     Encounter Diagnosis   Name     Essential hypertension- not controlled, change losartan 100mg to losartan-hydrochlorothiazide 100/25 1 daily in the morning. Monitor BP, check BMP in 2 weeks. RTC 4 weeks for BP check        Orders Placed This Encounter   Procedures    Basic Metabolic Panel (8) [E]       Meds & Refills for this  Visit:  Requested Prescriptions     Signed Prescriptions Disp Refills    losartan-hydroCHLOROthiazide 100-25 MG Oral Tab 90 tablet 1     Sig: Take 1 tablet by mouth daily.       Imaging & Consults:  None    Return if symptoms worsen or fail to improve, for f/u with me Nov 20th at 12 pm.  There are no Patient Instructions on file for this visit.      The patient indicates understanding of these issues and agrees to the plan.           [1]   Allergies  Allergen Reactions    Sulfa Drugs Cross Reactors UNKNOWN

## 2024-11-04 ENCOUNTER — LAB ENCOUNTER (OUTPATIENT)
Dept: LAB | Age: 74
End: 2024-11-04
Attending: INTERNAL MEDICINE
Payer: MEDICARE

## 2024-11-04 DIAGNOSIS — I10 ESSENTIAL HYPERTENSION: ICD-10-CM

## 2024-11-04 LAB
ANION GAP SERPL CALC-SCNC: 7 MMOL/L (ref 0–18)
BUN BLD-MCNC: 19 MG/DL (ref 9–23)
CALCIUM BLD-MCNC: 9.8 MG/DL (ref 8.7–10.4)
CHLORIDE SERPL-SCNC: 100 MMOL/L (ref 98–112)
CO2 SERPL-SCNC: 32 MMOL/L (ref 21–32)
CREAT BLD-MCNC: 0.84 MG/DL
EGFRCR SERPLBLD CKD-EPI 2021: 72 ML/MIN/1.73M2 (ref 60–?)
FASTING STATUS PATIENT QL REPORTED: YES
GLUCOSE BLD-MCNC: 91 MG/DL (ref 70–99)
OSMOLALITY SERPL CALC.SUM OF ELEC: 290 MOSM/KG (ref 275–295)
POTASSIUM SERPL-SCNC: 3.9 MMOL/L (ref 3.5–5.1)
SODIUM SERPL-SCNC: 139 MMOL/L (ref 136–145)

## 2024-11-04 PROCEDURE — 80048 BASIC METABOLIC PNL TOTAL CA: CPT

## 2024-11-04 PROCEDURE — 36415 COLL VENOUS BLD VENIPUNCTURE: CPT

## 2024-11-20 ENCOUNTER — OFFICE VISIT (OUTPATIENT)
Dept: INTERNAL MEDICINE CLINIC | Facility: CLINIC | Age: 74
End: 2024-11-20
Payer: MEDICARE

## 2024-11-20 VITALS
HEIGHT: 62 IN | OXYGEN SATURATION: 98 % | WEIGHT: 151.81 LBS | SYSTOLIC BLOOD PRESSURE: 136 MMHG | DIASTOLIC BLOOD PRESSURE: 80 MMHG | HEART RATE: 97 BPM | BODY MASS INDEX: 27.94 KG/M2

## 2024-11-20 DIAGNOSIS — I10 ESSENTIAL HYPERTENSION: Primary | ICD-10-CM

## 2024-11-20 PROCEDURE — 99213 OFFICE O/P EST LOW 20 MIN: CPT | Performed by: INTERNAL MEDICINE

## 2024-11-20 NOTE — PROGRESS NOTES
Nan King is a 74 year old female.    Chief Complaint   Patient presents with    Follow - Up     Room 4, ASZ, pt is here for BP follow up.       HPI:     Pleasant patient with HTN, HL, hypothyroidism here for follow up. Noticed when she started the losartan hydrochlorothiazide, BP came down fairly quickly to 117/80s.  After a while it did go up to the 130s and sometimes higher. Overall BP seems to be in better range than before. No SE noted on the losartan hydrochlorothiazide.   BP in office 136/80. Patient did have 2 cups of coffee this morning and is drinking 2 alcoholic beverages nightly. Discussed avoiding or at least cutting down on etoh and caffeine, does not help with blood pressure and overall heart health. She will work on it.    Patient Active Problem List   Diagnosis    Essential hypertension    Hypercholesterolemia    Dry eyes    History of thyroid cancer    Pattern dystrophy of macula    Post-surgical hypothyroidism    Abnormal CT scan of heart     Current Outpatient Medications   Medication Sig Dispense Refill    losartan-hydroCHLOROthiazide 100-25 MG Oral Tab Take 1 tablet by mouth daily. 90 tablet 1    SYNTHROID 112 MCG Oral Tab Take 1 tablet (112 mcg total) by mouth daily.      pravastatin 20 MG Oral Tab Take 1 tablet (20 mg total) by mouth nightly. 90 tablet 3    Cholecalciferol (VITAMIN D3) 1000 units Oral Cap Take 1 tablet by mouth daily.      Glucosamine-Chondroitin (GLUCOSAMINE CHONDR COMPLEX OR) Take by mouth 2 (two) times daily.        cycloSPORINE 0.05 % Ophthalmic Emulsion 1 drop 2 (two) times daily.      ASPIR-81 81 MG OR TBEC Take 1 tablet (81 mg total) by mouth daily.      CALCIUM + D OR 2 tablets PO QD      FIBERCON OR QD      MULTI-VITAMIN/MINERALS OR TABS 1 TABLET DAILY      levothyroxine 125 MCG Oral Tab Take 1 tablet (125 mcg total) by mouth before breakfast.        Past Medical History:    Herpes zoster    High blood pressure    High cholesterol    Unspecified disorder of  thyroid      Social History:  Social History     Socioeconomic History    Marital status:    Occupational History    Occupation:  center owner, grandmother     Employer: [employer]   Tobacco Use    Smoking status: Never    Smokeless tobacco: Never   Vaping Use    Vaping status: Never Used   Substance and Sexual Activity    Alcohol use: Yes     Alcohol/week: 0.0 standard drinks of alcohol     Comment: cage 19    Drug use: No   Other Topics Concern    Exercise Yes     Comment: off and on   Social History Narrative        5 children-healthy    Nonsmoker    1-2 drinks/night    No drugs    Retired -ran  center     Family History   Problem Relation Age of Onset    Cancer Father         melanoma  at 83    Heart Disorder Father         CABG in 50's    Heart Surgery Father 50        CABG    Heart Disorder Mother         supraventricular tachycardia    Breast Cancer Mother         still alive in     Pacemaker Mother         \"cardiac pacemaker programming and iterative adjustment courtney-procedure\"    Heart Surgery Mother         pacemaker    Arrhythmia Mother     Other (Melanoma) Mother          in  at 92 from metastatic melanoma     Cancer Brother         prostate        Allergies  Allergies[1]      REVIEW OF SYSTEMS:   GENERAL HEALTH:  no fevers   RESPIRATORY: no cough  CARDIOVASCULAR: denies chest pain  GI: denies abdominal pain  : no dysuria  NEURO: denies headaches  PSYCH: No reported depression   HEME: No adenopathy      EXAM:   /80   Pulse 97   Ht 5' 2\" (1.575 m)   Wt 151 lb 12.8 oz (68.9 kg)   LMP  (LMP Unknown)   SpO2 98%   BMI 27.76 kg/m²   GENERAL: well developed, well nourished,in no apparent distress  LUNGS: normal rate without respiratory distress, lungs clear to auscultation  CARDIO: RRR nl S1 S2  GI: normal bowel sounds, soft, NT/ND  EXTREMITIES: no cyanosis, clubbing or edema  NEURO: Alert and oriented    ASSESSMENT AND PLAN:     Encounter  Diagnosis   Name     Essential hypertension- controlled, continue same medications. Encouraged to cut down/avoid etoh use (currently taking 2 drinks nightly) and caffeine use (2 cups daily of coffee).        Orders Placed This Encounter   Procedures    Basic Metabolic Panel (8) [E]       Meds & Refills for this Visit:  Requested Prescriptions      No prescriptions requested or ordered in this encounter       Imaging & Consults:  None    Return in about 5 months (around 4/8/2025), or if symptoms worsen or fail to improve, for chronic issues, BP check.  There are no Patient Instructions on file for this visit.      The patient indicates understanding of these issues and agrees to the plan.           [1]   Allergies  Allergen Reactions    Sulfa Drugs Cross Reactors UNKNOWN

## 2024-11-21 RX ORDER — LOSARTAN POTASSIUM 100 MG/1
100 TABLET ORAL DAILY
Qty: 30 TABLET | Refills: 0 | OUTPATIENT
Start: 2024-11-21

## 2024-12-19 RX ORDER — LOSARTAN POTASSIUM 50 MG/1
50 TABLET ORAL DAILY
Qty: 90 TABLET | Refills: 0 | OUTPATIENT
Start: 2024-12-19

## 2025-01-03 ENCOUNTER — OFFICE VISIT (OUTPATIENT)
Dept: NEUROLOGY | Facility: CLINIC | Age: 75
End: 2025-01-03
Payer: MEDICARE

## 2025-01-03 VITALS
WEIGHT: 151 LBS | SYSTOLIC BLOOD PRESSURE: 138 MMHG | RESPIRATION RATE: 16 BRPM | HEART RATE: 108 BPM | BODY MASS INDEX: 27.79 KG/M2 | DIASTOLIC BLOOD PRESSURE: 85 MMHG | HEIGHT: 62 IN

## 2025-01-03 DIAGNOSIS — R20.8 DECREASED VIBRATORY SENSE: ICD-10-CM

## 2025-01-03 DIAGNOSIS — F10.90 ALCOHOL USE DISORDER: ICD-10-CM

## 2025-01-03 DIAGNOSIS — H53.9 TRANSIENT VISION DISTURBANCE OF BOTH EYES: Primary | ICD-10-CM

## 2025-01-03 PROCEDURE — 99204 OFFICE O/P NEW MOD 45 MIN: CPT | Performed by: OTHER

## 2025-01-03 RX ORDER — LOSARTAN POTASSIUM 100 MG/1
100 TABLET ORAL DAILY
COMMUNITY
Start: 2024-10-23 | End: 2025-01-03

## 2025-01-03 NOTE — H&P
[facility] New Patient / Consult Visit    Nan King is a 74 year old female.                         Referring MD: Elaina Kay MD      Chief Complaint   Patient presents with    Neurologic Problem     C/O of  having two episodes were one were  \"TV looked liked it was going up and down  and moving\" and the second episode a few months after reading news paper and it looked like paper was moving    Test Results     MRI Brain 10/16/2024       HPI:    Nan King is a 74 year old, who presents for evaluation of 2 episodes of 'dizzy sensation.\"    Patient reports she had 2 episodes.  Her first episode occurred in 2024 when she sitting and watching television and the world seemed to be \"bouncing up and down\" for ~5 minutes. She denied any associated loss of awareness or auras of odd tastes, smells or sounds. She did not have headaches. She then had another one in 2024 where she was seated and noted sudden onset of vision appearing to move up and down again; she states she again did not have any associated focal numbness, tingling/weakness or confusion.       Patient admits she drinks ~ 2 drinks with dinner nightly for the past year but denies any new medicaitons or other drug use or alcohol intake more than usual on the day of her event.  Otherwise, patient denies any recent weight change, fevers, chills, nausea, double vision/ blurry vision / loss of vision, chest pain, palpitations, shortness of breath, rashes, joint pains, bowel / bladder incontinence or mood issues.     Past Medical History:    Herpes zoster    High blood pressure    High cholesterol    Unspecified disorder of thyroid     Past Surgical History:   Procedure Laterality Date    Colonoscopy  ,     fiberoptic    Colonoscopy N/A 3/5/2015    Procedure: COLONOSCOPY;  Surgeon: Gerry Dubois MD;  Location:  ENDOSCOPY    Hysterectomy      + oophorectomy          5 children    Other surgical history   2020    bunionectomy    Thyroidectomy       Social History     Socioeconomic History    Marital status:    Occupational History    Occupation:  center owner, grandmother     Employer: [employer]   Tobacco Use    Smoking status: Never     Passive exposure: Never    Smokeless tobacco: Never   Vaping Use    Vaping status: Never Used   Substance and Sexual Activity    Alcohol use: Yes     Alcohol/week: 0.0 standard drinks of alcohol     Comment: cage 19    Drug use: No   Other Topics Concern    Caffeine Concern Yes     Comment: 2 cups a day    Exercise Yes     Comment: walks   Social History Narrative        5 children-healthy    Nonsmoker    1-2 drinks/night    No drugs    Retired -ran [employer] center     Family History   Problem Relation Age of Onset    Cancer Father         melanoma  at 83    Heart Disorder Father         CABG in 50's    Heart Surgery Father 50        CABG    Heart Disorder Mother         supraventricular tachycardia    Breast Cancer Mother         still alive in     Pacemaker Mother         \"cardiac pacemaker programming and iterative adjustment courtney-procedure\"    Heart Surgery Mother         pacemaker    Arrhythmia Mother     Other (Melanoma) Mother          in  at 92 from metastatic melanoma     Cancer Brother         prostate       Allergies:  Allergies[1]   Current Meds:  Current Outpatient Medications   Medication Sig Dispense Refill    losartan-hydroCHLOROthiazide 100-25 MG Oral Tab Take 1 tablet by mouth daily. 90 tablet 1    SYNTHROID 112 MCG Oral Tab Take 1 tablet (112 mcg total) by mouth daily.      pravastatin 20 MG Oral Tab Take 1 tablet (20 mg total) by mouth nightly. 90 tablet 3    Cholecalciferol (VITAMIN D3) 1000 units Oral Cap Take 1 tablet by mouth daily.      Glucosamine-Chondroitin (GLUCOSAMINE CHONDR COMPLEX OR) Take by mouth 2 (two) times daily.        cycloSPORINE 0.05 % Ophthalmic Emulsion 1 drop 2 (two) times daily.       ASPIR-81 81 MG OR TBEC Take 1 tablet (81 mg total) by mouth daily.      CALCIUM + D OR 2 tablets PO QD      FIBERCON OR QD      MULTI-VITAMIN/MINERALS OR TABS 1 TABLET DAILY            ROS:   A comprehensive 10 point review of systems was completed.  Pertinent positives and negatives noted in the the HPI.      PHYSICAL EXAM:   /85 (BP Location: Left arm, Patient Position: Sitting, Cuff Size: adult)   Pulse 108   Resp 16   Ht 62\"   Wt 151 lb (68.5 kg)   LMP  (LMP Unknown)   BMI 27.62 kg/m²   Estimated body mass index is 27.62 kg/m² as calculated from the following:    Height as of this encounter: 62\".    Weight as of this encounter: 151 lb (68.5 kg).    GENERAL: well developed, well nourished, in no apparent distress  SKIN: no rashes  EYES: sclera anicteric, conjunctiva normal  HEENT: normocephalic  CARDIOVASCULAR: S1, S2 normal, RRR  LUNGS: clear to auscultation bilaterally  EXTREMITIES: no cyanosis, peripheral pulses intact    Neck: Supple; full range of motion; no carotid bruits    Mental status:  Alert and oriented to time, place, person, and situation  Speech: fluent  Language: normal naming, repetition, and comprehension  Memory: normal  Attention/concentration: normal    Fundoscopic Exam: optic discs sharp bilaterally    Cranial Nerves: II through XII  Optic:    Pupils: equally round and reactive to light with direct and consensual responses, normal accomodation   Visual acuity: Normal              Visual fields: Normal  Oculomotor/Trochlear/Abducens:    Eye Movements: EOMI without nystagmus  Trigeminal:   Facial sensation:intact to light touch bilaterally  Facial:   Smile symmetric, eyebrow raise symmetric  Vestibulocochlear:   Hearing: normal bilaterally  Glossopharyngeal/Vagus:   Palate elevates symmetrically with midline uvula  Spinal accessory:   Shoulder Shrug: normal bilaterally   Lateral head turn: normal bilaterally  Hypoglossal:   Tongue movement: protrusion is midline with normal lateral  movements    Motor System:  Strength: 5/5 throughout  Tone: normal    Sensory:  Pin is mildly reduced distally at toes  Vibration is mildly reduced distal LE at great toes ~ 6 sec to extinguish   Proprioception is normal  Romberg is absent    Coordination:  Finger to nose normal bilaterally  Rapid alternating movements normal bilaterally  Heel to shin is normal bilaterally    DTRs:   2+, symmetric throughout, toes downgoing bilaterally; no clonus          Gait:  Normal casual, heel, toe and tandem gait    TEST RESULTS/DATA REVIEWED:     Labs  Reviewed  Component      Latest Ref Rng 8/17/2024 11/4/2024   WBC      4.0 - 11.0 x10(3) uL 8.8     RBC      3.80 - 5.30 x10(6)uL 5.10     Hemoglobin      12.0 - 16.0 g/dL 15.1     Hematocrit      35.0 - 48.0 % 46.0     Platelet Count      150.0 - 450.0 10(3)uL 236.0     MCV      80.0 - 100.0 fL 90.2     MCH      26.0 - 34.0 pg 29.6     MCHC      31.0 - 37.0 g/dL 32.8     RDW      % 12.0     Prelim Neutrophil Abs      1.50 - 7.70 x10 (3) uL 5.11     Neutrophils Absolute      1.50 - 7.70 x10(3) uL 5.11     Lymphocytes Absolute      1.00 - 4.00 x10(3) uL 2.60     Monocytes Absolute      0.10 - 1.00 x10(3) uL 0.73     Eosinophils Absolute      0.00 - 0.70 x10(3) uL 0.25     Basophils Absolute      0.00 - 0.20 x10(3) uL 0.06     Immature Granulocyte Absolute      0.00 - 1.00 x10(3) uL 0.02     Neutrophils %      % 58.3     Lymphocytes %      % 29.6     Monocytes %      % 8.3     Eosinophils %      % 2.9     Basophils %      % 0.7     Immature Granulocyte %      % 0.2     Glucose      70 - 99 mg/dL 96  91    Sodium      136 - 145 mmol/L 139  139    Potassium      3.5 - 5.1 mmol/L 4.6  3.9    Chloride      98 - 112 mmol/L 104  100    Carbon Dioxide, Total      21.0 - 32.0 mmol/L 29.0  32.0    ANION GAP      0 - 18 mmol/L 6  7    BUN      9 - 23 mg/dL 23  19    CREATININE      0.55 - 1.02 mg/dL 0.82  0.84    CALCIUM      8.7 - 10.4 mg/dL 9.9  9.8    CALCULATED OSMOLALITY      275 -  295 mOsm/kg 292  290    EGFR      >=60 mL/min/1.73m2 74  72    AST (SGOT)      <34 U/L 25     ALT (SGPT)      10 - 49 U/L 23     ALKALINE PHOSPHATASE      55 - 142 U/L 70     Total Bilirubin      0.2 - 1.1 mg/dL 0.8     PROTEIN, TOTAL      5.7 - 8.2 g/dL 7.4     Albumin      3.2 - 4.8 g/dL 5.0 (H)     Globulin      2.0 - 3.5 g/dL 2.4     A/G Ratio      1.0 - 2.0  2.1 (H)     Patient Fasting for CMP? Yes     Patient Fasting for BMP?  Yes    Cholesterol, Total      <200 mg/dL 177     HDL Cholesterol      40 - 59 mg/dL 52     Triglycerides      30 - 149 mg/dL 126     LDL Cholesterol Calc      <100 mg/dL 103 (H)     VLDL      0 - 30 mg/dL 21     NON-HDL CHOLESTEROL      <130 mg/dL 125     Patient Fasting for Lipid? Yes     TSH      0.550 - 4.780 mIU/mL 0.232 (L)     T4,Free (Direct)      0.8 - 1.7 ng/dL 1.8 (H)          Imaging  MRI BRAIN (CPT=70551)    Result Date: 10/16/2024  CONCLUSION:   1. No acute intracranial abnormality identified.  2. Mild chronic microvascular ischemic changes in the cerebral white matter and kendal.   LOCATION:  [facility]   Dictated by (CST): Ryan Myers MD on 10/16/2024 at 2:59 PM     Finalized by (CST): Ryan Myers MD on 10/16/2024 at 3:01 PM        IMPRESSION AND PLAN:   Nan King is a 74 year old female who presents for evaluation of 2 episodes of 'dizzy sensation.\"    Patient reports she had 2 episodes.  Her first episode occurred in February 2024 when she sitting and watching television and the world seemed to be \"bouncing up and down\" for ~5 minutes. She denied any associated loss of awareness or auras of odd tastes, smells or sounds. She did not have headaches. She then had another one in 9/2024 where she was seated and noted sudden onset of vision appearing to move up and down again; she states she again did not have any associated focal numbness, tingling/weakness or confusion.       Patient admits she drinks ~ 2 drinks with dinner nightly for the past year but denies any  new medicaitons or other drug use or alcohol intake more than usual on the day of her event      Neurologic exam only shows mild decreased sensation distally and her symptoms are of unclear etiology; MRI brain was done and did not show any secondary intracranial pathology or brainstem pathology. She had transient vision changes, with similar presentation on two separate occasion and will check EEG to rule out epileptiform activity can check B12 level as well; otherwise, she has not had recurrent events and recommend watchful waiting.     1. Transient vision disturbance of both eyes  As noted above  - EEG; Future    2. Decreased vibratory sense  As noted above   - Vitamin B12; Future    3. Alcohol use disorder  As noted above    No follow-ups on file.    Copy of note was sent to referring physician.      Carmine Casey MD, Neurology  [facility]  Pager [phone]  1/3/2025           [1]   Allergies  Allergen Reactions    Sulfa Drugs Cross Reactors UNKNOWN

## 2025-01-03 NOTE — PATIENT INSTRUCTIONS
Refill policies:    Allow 2-3 business days for refills; controlled substances may take longer.  Contact your pharmacy at least 5 days prior to running out of medication and have them send an electronic request or submit request through the “request refill” option in your [employer] account.  Refills are not addressed on weekends; covering physicians do not authorize routine medications on weekends.  No narcotics or controlled substances are refilled after noon on Fridays or by on call physicians.  By law, narcotics must be electronically prescribed.  A 30 day supply with no refills is the maximum allowed.  If your prescription is due for a refill, you may be due for a follow up appointment.  To best provide you care, patients receiving routine medications need to be seen at least once a year.  Patients receiving narcotic/controlled substance medications need to be seen at least once every 3 months.  In the event that your preferred pharmacy does not have the requested medication in stock (e.g. Backordered), it is your responsibility to find another pharmacy that has the requested medication available.  We will gladly send a new prescription to that pharmacy at your request.    Scheduling Tests:    If your physician has ordered radiology tests such as MRI or CT scans, please contact Central Scheduling at [phone] right away to schedule the test.  Once scheduled, the [facility] Centralized Referral Team will work with your insurance carrier to obtain pre-certification or prior authorization.  Depending on your insurance carrier, approval may take 3-10 days.  It is highly recommended patients assure they have received an authorization before having a test performed.  If test is done without insurance authorization, patient may be responsible for the entire amount billed.      Precertification and Prior Authorizations:  If your physician has recommended that you have a procedure or additional testing performed the [facility]  Centralized Referral Team will contact your insurance carrier to obtain pre-certification or prior authorization.    You are strongly encouraged to contact your insurance carrier to verify that your procedure/test has been approved and is a COVERED benefit.  Although the [facility] Centralized Referral Team does its due diligence, the insurance carrier gives the disclaimer that \"Although the procedure is authorized, this does not guarantee payment.\"    Ultimately the patient is responsible for payment.   Thank you for your understanding in this matter.  Paperwork Completion:  If you require FMLA or disability paperwork for your recovery, please make sure to either drop it off or have it faxed to our office at [phone]. Be sure the form has your name and date of birth on it.  The form will be faxed to our Forms Department and they will complete it for you.  There is a 25$ fee for all forms that need to be filled out.  Please be aware there is a 10-14 day turnaround time.  You will need to sign a release of information (ROSELINE) form if your paperwork does not come with one.  You may call the Forms Department with any questions at [phone].  Their fax number is [phone].

## 2025-01-14 ENCOUNTER — APPOINTMENT (OUTPATIENT)
Dept: ELECTROPHYSIOLOGY | Facility: HOSPITAL | Age: 75
End: 2025-01-14
Attending: Other
Payer: MEDICARE

## 2025-01-14 ENCOUNTER — LAB ENCOUNTER (OUTPATIENT)
Dept: LAB | Facility: HOSPITAL | Age: 75
End: 2025-01-14
Attending: Other
Payer: MEDICARE

## 2025-01-14 DIAGNOSIS — I10 ESSENTIAL HYPERTENSION: ICD-10-CM

## 2025-01-14 DIAGNOSIS — R20.8 DECREASED VIBRATORY SENSE: ICD-10-CM

## 2025-01-14 DIAGNOSIS — C73 THYROID CANCER (HCC): Primary | ICD-10-CM

## 2025-01-14 LAB
T4 SERPL-MCNC: 11.3 UG/DL
THYROGLOB SERPL-MCNC: <15 U/ML (ref ?–60)
TSI SER-ACNC: 2.33 UIU/ML (ref 0.55–4.78)
VIT B12 SERPL-MCNC: 1423 PG/ML (ref 211–911)

## 2025-01-14 PROCEDURE — 84443 ASSAY THYROID STIM HORMONE: CPT

## 2025-01-14 PROCEDURE — 36415 COLL VENOUS BLD VENIPUNCTURE: CPT

## 2025-01-14 PROCEDURE — 82607 VITAMIN B-12: CPT

## 2025-01-14 PROCEDURE — 86800 THYROGLOBULIN ANTIBODY: CPT

## 2025-01-14 PROCEDURE — 84436 ASSAY OF TOTAL THYROXINE: CPT

## 2025-01-22 ENCOUNTER — NURSE ONLY (OUTPATIENT)
Dept: ELECTROPHYSIOLOGY | Facility: HOSPITAL | Age: 75
End: 2025-01-22
Attending: Other
Payer: MEDICARE

## 2025-01-22 DIAGNOSIS — H53.9 TRANSIENT VISION DISTURBANCE OF BOTH EYES: ICD-10-CM

## 2025-01-22 PROCEDURE — 95819 EEG AWAKE AND ASLEEP: CPT

## 2025-01-25 ENCOUNTER — MED REC SCAN ONLY (OUTPATIENT)
Dept: INTERNAL MEDICINE CLINIC | Facility: CLINIC | Age: 75
End: 2025-01-25

## 2025-01-27 ENCOUNTER — TELEPHONE (OUTPATIENT)
Dept: NEUROLOGY | Facility: CLINIC | Age: 75
End: 2025-01-27

## 2025-01-27 NOTE — TELEPHONE ENCOUNTER
Patient stated she completed EEG on 1/22/25 and needs to discuss results.    Patient stated she went to a eye specialist [facility] and stated current issues have nothing to do with her eyes.    Please call patient to discuss and advise.

## 2025-02-03 NOTE — TELEPHONE ENCOUNTER
Patient calling she is requesting her EEG test results mailed to her home. Report was mailed out to her home

## 2025-02-03 NOTE — TELEPHONE ENCOUNTER
Patient calling again to get her EEG test resuls  She is getting frustrated that he hasn't  call back

## 2025-02-03 NOTE — TELEPHONE ENCOUNTER
Patient notified of Dr. Casey's EEG result. All questions answered.    Per request, will forward this encounter to patient's primary, Dr. Kay, to alert her.

## 2025-02-10 ENCOUNTER — PATIENT MESSAGE (OUTPATIENT)
Dept: NEUROLOGY | Facility: CLINIC | Age: 75
End: 2025-02-10

## 2025-02-10 NOTE — TELEPHONE ENCOUNTER
Patient informed that she was left a voicemail on Feb 3rd, her PCP was notified via [facility] of EEG results being normal and a printed report was already mailed to her via [employer] Postal service. Results of EEG copied and pasted to [email or web address] message.     [employer] phone number and [email or web address] website provided to patient to reach out regarding [email or web address] questions.

## 2025-02-19 ENCOUNTER — PATIENT MESSAGE (OUTPATIENT)
Dept: FAMILY MEDICINE CLINIC | Facility: CLINIC | Age: 75
End: 2025-02-19

## 2025-02-19 DIAGNOSIS — Z14.8 HEMOCHROMATOSIS CARRIER: Primary | ICD-10-CM

## 2025-02-19 DIAGNOSIS — R79.9 ABNORMAL FINDING OF BLOOD CHEMISTRY, UNSPECIFIED: ICD-10-CM

## 2025-03-21 RX ORDER — LOSARTAN POTASSIUM AND HYDROCHLOROTHIAZIDE 25; 100 MG/1; MG/1
1 TABLET ORAL DAILY
Qty: 90 TABLET | Refills: 1 | Status: SHIPPED | OUTPATIENT
Start: 2025-03-21 | End: 2026-03-16

## 2025-03-21 NOTE — TELEPHONE ENCOUNTER
Please review. Protocol Pass    Original rx written 90 with no refills.  Rx is pended, is refill appropriate?

## 2025-03-31 ENCOUNTER — LAB ENCOUNTER (OUTPATIENT)
Dept: LAB | Age: 75
End: 2025-03-31
Attending: INTERNAL MEDICINE
Payer: MEDICARE

## 2025-03-31 ENCOUNTER — OFFICE VISIT (OUTPATIENT)
Dept: INTERNAL MEDICINE CLINIC | Facility: CLINIC | Age: 75
End: 2025-03-31
Payer: MEDICARE

## 2025-03-31 VITALS
DIASTOLIC BLOOD PRESSURE: 88 MMHG | HEIGHT: 62 IN | HEART RATE: 71 BPM | RESPIRATION RATE: 16 BRPM | WEIGHT: 154 LBS | SYSTOLIC BLOOD PRESSURE: 139 MMHG | TEMPERATURE: 97 F | OXYGEN SATURATION: 97 % | BODY MASS INDEX: 28.34 KG/M2

## 2025-03-31 DIAGNOSIS — Z83.49 FAMILY HISTORY OF HEMOCHROMATOSIS: ICD-10-CM

## 2025-03-31 DIAGNOSIS — I10 ESSENTIAL HYPERTENSION: Primary | ICD-10-CM

## 2025-03-31 DIAGNOSIS — E78.00 HYPERCHOLESTEROLEMIA: ICD-10-CM

## 2025-03-31 DIAGNOSIS — Z85.850 HISTORY OF THYROID CANCER: ICD-10-CM

## 2025-03-31 PROCEDURE — 36415 COLL VENOUS BLD VENIPUNCTURE: CPT

## 2025-03-31 PROCEDURE — G2211 COMPLEX E/M VISIT ADD ON: HCPCS | Performed by: INTERNAL MEDICINE

## 2025-03-31 PROCEDURE — 99214 OFFICE O/P EST MOD 30 MIN: CPT | Performed by: INTERNAL MEDICINE

## 2025-03-31 PROCEDURE — 81256 HFE GENE: CPT

## 2025-03-31 RX ORDER — AMLODIPINE BESYLATE 5 MG/1
5 TABLET ORAL DAILY
Qty: 30 TABLET | Refills: 2 | Status: SHIPPED | OUTPATIENT
Start: 2025-03-31 | End: 2026-03-26

## 2025-03-31 NOTE — PROGRESS NOTES
Nan King is a 74 year old female.    Chief Complaint   Patient presents with    Follow - Up     Rm 4 SS         HPI:     Patient with HTN, HL, h/o thyroid cancer, post surgical hypothyroidism here for follow up. She takes losartan hydrochlorothiazide 100/25 in the am. BP typically 140s/90s. This morning BP was 144/92. She took medication at 7 am. No HA/CP. She would like to see her numbers improve, open to adding another medication as she is on max dose losartan.  She is compliant with pravastatin for HL, no SE reported. Due to check lipids in August. She follows with Dr. Cook for thyroid medication, dose recently adjusted.  She has family h/o hemochromatosis, would like to check hered hemochromatosis DNA (came with specific lab she wants)       Patient Active Problem List   Diagnosis    Essential hypertension    Hypercholesterolemia    Dry eyes    History of thyroid cancer    Pattern dystrophy of macula    Post-surgical hypothyroidism    Abnormal CT scan of heart    Transient vision disturbance of both eyes     Current Outpatient Medications   Medication Sig Dispense Refill    amLODIPine 5 MG Oral Tab Take 1 tablet (5 mg total) by mouth daily. 30 tablet 2    losartan-hydroCHLOROthiazide 100-25 MG Oral Tab Take 1 tablet by mouth daily. 90 tablet 1    SYNTHROID 112 MCG Oral Tab Take 1 tablet (112 mcg total) by mouth daily.      pravastatin 20 MG Oral Tab Take 1 tablet (20 mg total) by mouth nightly. 90 tablet 3    Cholecalciferol (VITAMIN D3) 1000 units Oral Cap Take 1 tablet by mouth daily.      Glucosamine-Chondroitin (GLUCOSAMINE CHONDR COMPLEX OR) Take by mouth 2 (two) times daily.        cycloSPORINE 0.05 % Ophthalmic Emulsion 1 drop 2 (two) times daily.      ASPIR-81 81 MG OR TBEC Take 1 tablet (81 mg total) by mouth daily.      CALCIUM + D OR 2 tablets PO QD      FIBERCON OR QD      MULTI-VITAMIN/MINERALS OR TABS 1 TABLET DAILY        Past Medical History:    Herpes zoster    High blood pressure    High  cholesterol    Unspecified disorder of thyroid      Social History:  Social History     Socioeconomic History    Marital status:    Occupational History    Occupation:  center owner, grandmother     Employer: [employer]   Tobacco Use    Smoking status: Never     Passive exposure: Never    Smokeless tobacco: Never   Vaping Use    Vaping status: Never Used   Substance and Sexual Activity    Alcohol use: Yes     Alcohol/week: 0.0 standard drinks of alcohol     Comment: cage 19    Drug use: No   Other Topics Concern    Caffeine Concern Yes     Comment: 2 cups a day    Exercise Yes     Comment: walks   Social History Narrative        5 children-healthy    Nonsmoker    1-2 drinks/night    No drugs    Retired -ran [employer]     Family History   Problem Relation Age of Onset    Cancer Father         melanoma  at 83    Heart Disorder Father         CABG in 50's    Heart Surgery Father 50        CABG    Heart Disorder Mother         supraventricular tachycardia    Breast Cancer Mother         still alive in     Pacemaker Mother         \"cardiac pacemaker programming and iterative adjustment courtney-procedure\"    Heart Surgery Mother         pacemaker    Arrhythmia Mother     Other (Melanoma) Mother          in  at 92 from metastatic melanoma     Cancer Brother         prostate        Allergies  Allergies[1]      REVIEW OF SYSTEMS:   GENERAL HEALTH:  no fevers   RESPIRATORY: no cough  CARDIOVASCULAR: denies chest pain  GI: denies abdominal pain  : no dysuria  NEURO: denies headaches  PSYCH: No reported depression   HEME: No adenopathy      EXAM:   /88   Pulse 71   Temp 96.6 °F (35.9 °C) (Temporal)   Resp 16   Ht 5' 2\" (1.575 m)   Wt 154 lb (69.9 kg)   LMP  (LMP Unknown)   SpO2 97%   BMI 28.17 kg/m²   GENERAL: well developed, well nourished,in no apparent distress  HEENT: atraumatic, normocephalic  NECK: supple,no adenopathy  LUNGS: normal rate without respiratory  distress, lungs clear to auscultation  CARDIO: RRR nl S1 S2  GI: normal bowel sounds, soft, NT/ND  EXTREMITIES: no cyanosis, clubbing or edema  NEURO: Alert and oriented    ASSESSMENT AND PLAN:     Encounter Diagnoses   Name     Family history of hemochromatosis- hered hemochromatosis DNA testing ordered      Essential hypertension- not well controlled, add amlodipine 5mg qpm to losartan hydrochlorothiazide 100/25. Monitor BP and send me BP readings     Hypercholesterolemia- continue pravastatin, check labs in August     History of thyroid cancer, post surgical hypothyroidism- levothyroxine managed by Dr. Cook. Clinically stable        Orders Placed This Encounter   Procedures    Hered.Hemochromatosis, Dna    Lipid Panel [E]    Comp Metabolic Panel (14)    CBC W Differential W Platelet [E]       Meds & Refills for this Visit:  Requested Prescriptions     Signed Prescriptions Disp Refills    amLODIPine 5 MG Oral Tab 30 tablet 2     Sig: Take 1 tablet (5 mg total) by mouth daily.       Imaging & Consults:  None    Return in about 4 months (around 8/12/2025), or if symptoms worsen or fail to improve, for annual, message me in 1-2 weeks with BP.  There are no Patient Instructions on file for this visit.      The patient indicates understanding of these issues and agrees to the plan.           [1]   Allergies  Allergen Reactions    Sulfa Drugs Cross Reactors UNKNOWN

## 2025-04-19 ENCOUNTER — HOSPITAL ENCOUNTER (OUTPATIENT)
Age: 75
Discharge: HOME | End: 2025-04-19
Payer: MEDICARE

## 2025-04-19 VITALS
DIASTOLIC BLOOD PRESSURE: 80 MMHG | HEART RATE: 109 BPM | BODY MASS INDEX: 27 KG/M2 | WEIGHT: 145 LBS | TEMPERATURE: 98 F | RESPIRATION RATE: 18 BRPM | SYSTOLIC BLOOD PRESSURE: 144 MMHG | OXYGEN SATURATION: 97 %

## 2025-04-19 DIAGNOSIS — R21 RASH AND NONSPECIFIC SKIN ERUPTION: Primary | ICD-10-CM

## 2025-04-19 PROCEDURE — 99203 OFFICE O/P NEW LOW 30 MIN: CPT

## 2025-04-19 PROCEDURE — 99212 OFFICE O/P EST SF 10 MIN: CPT

## 2025-04-19 NOTE — ED PROVIDER NOTES
Patient Seen in: [facility]      History   No chief complaint on file.    Stated Complaint: Sores on mouth    Subjective:   This is a 74-year-old female with below stated medical history.  Presents to immediate care for nonspecific rash under the lower lip on both sides of the face.  Patient states she has been using a strong skin cream to her hands which is not to be used on the face.  She is worried about possible cross-contamination.  She does not describe this rash is painful or itchy.  She noticed the rash erupted over the last few days.  She has placed Neosporin on it with no relief.  No new facial hygiene products.  No new food or drink.  No lip or throat swelling.  No difficulty breathing or wheezing.  No other treatment attempted prior to arrival.    The history is provided by the patient.         History of Present Illness               Objective:     Past Medical History:    Herpes zoster    High blood pressure    High cholesterol    Unspecified disorder of thyroid              Past Surgical History:   Procedure Laterality Date    Colonoscopy  ,     fiberoptic    Colonoscopy N/A 3/5/2015    Procedure: COLONOSCOPY;  Surgeon: Gerry Dubois MD;  Location:  ENDOSCOPY    Hysterectomy      + oophorectomy          5 children    Other surgical history  2020    bunionectomy    Thyroidectomy                  Social History     Socioeconomic History    Marital status:    Occupational History    Occupation:  center owner, grandmother     Employer: [employer]   Tobacco Use    Smoking status: Never     Passive exposure: Never    Smokeless tobacco: Never   Vaping Use    Vaping status: Never Used   Substance and Sexual Activity    Alcohol use: Yes     Alcohol/week: 0.0 standard drinks of alcohol     Comment: cage 19    Drug use: No   Other Topics Concern    Caffeine Concern Yes     Comment: 2 cups a day    Exercise Yes     Comment: walks   Social History  Narrative        5 children-healthy    Nonsmoker    1-2 drinks/night    No drugs    Retired -ran  center              Review of Systems   Constitutional:  Negative for chills and fever.   HENT:  Negative for congestion and sore throat.    Respiratory:  Negative for cough.    Cardiovascular:  Negative for chest pain.   Gastrointestinal:  Negative for abdominal pain.   Genitourinary:  Negative for dysuria.   Musculoskeletal:  Negative for back pain and neck stiffness.   Skin:  Positive for rash.   Neurological:  Negative for headaches.       Positive for stated complaint: Sores on mouth  Other systems are as noted in HPI.  Constitutional and vital signs reviewed.      All other systems reviewed and negative except as noted above.                  Physical Exam     ED Triage Vitals [04/19/25 0942]   /80   Pulse 109   Resp 18   Temp 97.7 °F (36.5 °C)   Temp src Oral   SpO2 97 %   O2 Device None (Room air)       Current Vitals:   Vital Signs  BP: 144/80  Pulse: 109  Resp: 18  Temp: 97.7 °F (36.5 °C)  Temp src: Oral    Oxygen Therapy  SpO2: 97 %  O2 Device: None (Room air)        Physical Exam  Vitals and nursing note reviewed.   Constitutional:       General: She is not in acute distress.     Appearance: Normal appearance. She is not ill-appearing, toxic-appearing or diaphoretic.   HENT:      Head: Normocephalic and atraumatic.      Right Ear: External ear normal.      Left Ear: External ear normal.      Nose: Nose normal.      Mouth/Throat:      Mouth: Mucous membranes are moist.      Pharynx: Oropharynx is clear.   Eyes:      General:         Right eye: No discharge.         Left eye: No discharge.      Extraocular Movements: Extraocular movements intact.      Conjunctiva/sclera: Conjunctivae normal.   Cardiovascular:      Rate and Rhythm: Normal rate.   Pulmonary:      Effort: Pulmonary effort is normal.   Musculoskeletal:      Cervical back: Neck supple.      Right lower leg: No edema.      Left  lower leg: No edema.   Skin:     General: Skin is warm and dry.      Capillary Refill: Capillary refill takes less than 2 seconds.      Findings: Rash present. No abscess, ecchymosis, erythema, lesion or petechiae. Rash is macular. Rash is not crusting, nodular, papular, purpuric, pustular, scaling, urticarial or vesicular.          Neurological:      Mental Status: She is alert and oriented to person, place, and time.   Psychiatric:         Mood and Affect: Mood normal.         Behavior: Behavior normal.         Physical Exam                ED Course   Labs Reviewed - No data to display       Results            DC home.                      MDM        Vital signs stable.  Patient is well-appearing and nontoxic looking.  Presents to immediate care for rash below the lower lips.    Differential diagnosis include but is not limited to herpes zoster, contact dermatitis, tinea, atopic dermatitis,urticaria, angular typhlitis, allergic reaction, less likely anaphylaxis or angioedema    Rash pattern is macular and below the lower lip on bilateral sides of the face.  No vesicles.  No underlying cellulitis.  No concern for tinea or shingles.  No concern for anaphylaxis or angioedema on exam.    Clinical impression is contact otitis    DC home.  Recommended over-the-counter 1% hydrocortisone cream to the area twice a day.  Avoid the sun.  Good handwashing discussed.  PCP and dermatology follow-up as scheduled.  Reasons to return reviewed.  Patient verbalized understanding, and agreed plan of care.  All questions answered.            Medical Decision Making      Disposition and Plan     Clinical Impression:  1. Rash and nonspecific skin eruption         Disposition:  Discharge  4/19/2025  9:54 am    Follw-up:  Elaina Kay MD  [address] [place] [address]  [phone]      As needed          Medications Prescribed:  Current Discharge Medication List          Supplementary Documentation:

## 2025-04-19 NOTE — DISCHARGE INSTRUCTIONS
Please use over-the-counter 1% hydrocortisone cream to the area twice a day.  Avoid the sun.  Follow-up with your primary dermatology as scheduled.

## 2025-05-15 ENCOUNTER — HOSPITAL ENCOUNTER (OUTPATIENT)
Dept: BONE DENSITY | Age: 75
Discharge: HOME | End: 2025-05-15
Attending: INTERNAL MEDICINE
Payer: MEDICARE

## 2025-05-15 DIAGNOSIS — Z78.0 POST-MENOPAUSAL: ICD-10-CM

## 2025-05-15 PROCEDURE — 77080 DXA BONE DENSITY AXIAL: CPT | Performed by: INTERNAL MEDICINE

## 2025-05-17 DIAGNOSIS — I10 ESSENTIAL HYPERTENSION: ICD-10-CM

## 2025-05-20 RX ORDER — AMLODIPINE BESYLATE 5 MG/1
5 TABLET ORAL DAILY
Qty: 90 TABLET | Refills: 0 | Status: SHIPPED | OUTPATIENT
Start: 2025-05-20 | End: 2026-05-15

## 2025-08-11 DIAGNOSIS — E78.00 HYPERCHOLESTEROLEMIA: ICD-10-CM

## 2025-08-13 RX ORDER — PRAVASTATIN SODIUM 20 MG
20 TABLET ORAL NIGHTLY
Qty: 90 TABLET | Refills: 3 | Status: SHIPPED | OUTPATIENT
Start: 2025-08-13

## 2025-08-15 DIAGNOSIS — I10 ESSENTIAL HYPERTENSION: ICD-10-CM

## 2025-08-18 ENCOUNTER — TELEPHONE (OUTPATIENT)
Dept: INTERNAL MEDICINE CLINIC | Facility: CLINIC | Age: 75
End: 2025-08-18

## 2025-08-18 RX ORDER — AMLODIPINE BESYLATE 5 MG/1
5 TABLET ORAL DAILY
Qty: 90 TABLET | Refills: 0 | Status: SHIPPED | OUTPATIENT
Start: 2025-08-18

## 2025-08-19 ENCOUNTER — LABORATORY ENCOUNTER (OUTPATIENT)
Dept: LAB | Age: 75
End: 2025-08-19
Attending: INTERNAL MEDICINE

## 2025-08-19 DIAGNOSIS — E78.00 HYPERCHOLESTEROLEMIA: ICD-10-CM

## 2025-08-19 DIAGNOSIS — Z83.49 FAMILY HISTORY OF HEMOCHROMATOSIS: ICD-10-CM

## 2025-08-19 DIAGNOSIS — R79.9 ABNORMAL FINDING OF BLOOD CHEMISTRY, UNSPECIFIED: ICD-10-CM

## 2025-08-19 DIAGNOSIS — Z14.8 HEMOCHROMATOSIS CARRIER: ICD-10-CM

## 2025-08-19 DIAGNOSIS — I10 ESSENTIAL HYPERTENSION: ICD-10-CM

## 2025-08-19 DIAGNOSIS — Z85.850 HISTORY OF THYROID CANCER: ICD-10-CM

## 2025-08-19 LAB
ALBUMIN SERPL-MCNC: 4.8 G/DL (ref 3.2–4.8)
ALBUMIN/GLOB SERPL: 1.9 (ref 1–2)
ALP LIVER SERPL-CCNC: 77 U/L (ref 55–142)
ALT SERPL-CCNC: 31 U/L (ref 10–49)
ANION GAP SERPL CALC-SCNC: 10 MMOL/L (ref 0–18)
AST SERPL-CCNC: 30 U/L (ref ?–34)
BASOPHILS # BLD AUTO: 0.07 X10(3) UL (ref 0–0.2)
BASOPHILS NFR BLD AUTO: 0.7 %
BILIRUB SERPL-MCNC: 0.8 MG/DL (ref 0.2–1.1)
BUN BLD-MCNC: 19 MG/DL (ref 9–23)
CALCIUM BLD-MCNC: 9.2 MG/DL (ref 8.7–10.6)
CHLORIDE SERPL-SCNC: 101 MMOL/L (ref 98–112)
CHOLEST SERPL-MCNC: 211 MG/DL (ref ?–200)
CO2 SERPL-SCNC: 30 MMOL/L (ref 21–32)
CREAT BLD-MCNC: 0.92 MG/DL (ref 0.55–1.02)
EGFRCR SERPLBLD CKD-EPI 2021: 64 ML/MIN/1.73M2 (ref 60–?)
EOSINOPHIL # BLD AUTO: 0.2 X10(3) UL (ref 0–0.7)
EOSINOPHIL NFR BLD AUTO: 2.1 %
ERYTHROCYTE [DISTWIDTH] IN BLOOD BY AUTOMATED COUNT: 12.5 %
FASTING PATIENT LIPID ANSWER: YES
FASTING STATUS PATIENT QL REPORTED: YES
GLOBULIN PLAS-MCNC: 2.5 G/DL (ref 2–3.5)
GLUCOSE BLD-MCNC: 104 MG/DL (ref 70–99)
HCT VFR BLD AUTO: 44.8 % (ref 35–48)
HDLC SERPL-MCNC: 66 MG/DL (ref 40–59)
HGB BLD-MCNC: 14.6 G/DL (ref 12–16)
IMM GRANULOCYTES # BLD AUTO: 0.05 X10(3) UL (ref 0–1)
IMM GRANULOCYTES NFR BLD: 0.5 %
IRON SATN MFR SERPL: 28 % (ref 15–50)
IRON SERPL-MCNC: 93 UG/DL (ref 50–170)
LDLC SERPL CALC-MCNC: 112 MG/DL (ref ?–100)
LYMPHOCYTES # BLD AUTO: 2.44 X10(3) UL (ref 1–4)
LYMPHOCYTES NFR BLD AUTO: 25.8 %
MCH RBC QN AUTO: 30.4 PG (ref 26–34)
MCHC RBC AUTO-ENTMCNC: 32.6 G/DL (ref 31–37)
MCV RBC AUTO: 93.3 FL (ref 80–100)
MONOCYTES # BLD AUTO: 0.75 X10(3) UL (ref 0.1–1)
MONOCYTES NFR BLD AUTO: 7.9 %
NEUTROPHILS # BLD AUTO: 5.94 X10 (3) UL (ref 1.5–7.7)
NEUTROPHILS # BLD AUTO: 5.94 X10(3) UL (ref 1.5–7.7)
NEUTROPHILS NFR BLD AUTO: 63 %
NONHDLC SERPL-MCNC: 145 MG/DL (ref ?–130)
OSMOLALITY SERPL CALC.SUM OF ELEC: 295 MOSM/KG (ref 275–295)
PLATELET # BLD AUTO: 243 10(3)UL (ref 150–450)
POTASSIUM SERPL-SCNC: 4.2 MMOL/L (ref 3.5–5.1)
PROT SERPL-MCNC: 7.3 G/DL (ref 5.7–8.2)
RBC # BLD AUTO: 4.8 X10(6)UL (ref 3.8–5.3)
SODIUM SERPL-SCNC: 141 MMOL/L (ref 136–145)
TOTAL IRON BINDING CAPACITY: 335 UG/DL (ref 250–425)
TRANSFERRIN SERPL-MCNC: 271 MG/DL (ref 250–380)
TRIGL SERPL-MCNC: 192 MG/DL (ref 30–149)
VLDLC SERPL CALC-MCNC: 33 MG/DL (ref 0–30)
WBC # BLD AUTO: 9.5 X10(3) UL (ref 4–11)

## 2025-08-19 PROCEDURE — 85025 COMPLETE CBC W/AUTO DIFF WBC: CPT

## 2025-08-19 PROCEDURE — 83540 ASSAY OF IRON: CPT

## 2025-08-19 PROCEDURE — 36415 COLL VENOUS BLD VENIPUNCTURE: CPT

## 2025-08-19 PROCEDURE — 80053 COMPREHEN METABOLIC PANEL: CPT

## 2025-08-19 PROCEDURE — 80061 LIPID PANEL: CPT

## 2025-08-19 PROCEDURE — 83550 IRON BINDING TEST: CPT

## 2025-08-25 ENCOUNTER — OFFICE VISIT (OUTPATIENT)
Dept: INTERNAL MEDICINE CLINIC | Facility: CLINIC | Age: 75
End: 2025-08-25

## 2025-08-25 VITALS
BODY MASS INDEX: 27.29 KG/M2 | HEART RATE: 88 BPM | TEMPERATURE: 97 F | WEIGHT: 154 LBS | OXYGEN SATURATION: 95 % | RESPIRATION RATE: 17 BRPM | SYSTOLIC BLOOD PRESSURE: 138 MMHG | HEIGHT: 63 IN | DIASTOLIC BLOOD PRESSURE: 74 MMHG

## 2025-08-25 DIAGNOSIS — E78.5 DYSLIPIDEMIA: ICD-10-CM

## 2025-08-25 DIAGNOSIS — Z12.31 ENCOUNTER FOR SCREENING MAMMOGRAM FOR MALIGNANT NEOPLASM OF BREAST: ICD-10-CM

## 2025-08-25 DIAGNOSIS — E89.0 POST-SURGICAL HYPOTHYROIDISM: ICD-10-CM

## 2025-08-25 DIAGNOSIS — K59.09 CHRONIC CONSTIPATION: ICD-10-CM

## 2025-08-25 DIAGNOSIS — Z00.00 ENCOUNTER FOR MEDICARE ANNUAL WELLNESS EXAM: Primary | ICD-10-CM

## 2025-08-25 DIAGNOSIS — H53.9 TRANSIENT VISION DISTURBANCE OF BOTH EYES: ICD-10-CM

## 2025-08-25 DIAGNOSIS — I10 ESSENTIAL HYPERTENSION: ICD-10-CM

## 2025-08-25 DIAGNOSIS — Z85.850 HISTORY OF THYROID CANCER: ICD-10-CM

## (undated) DIAGNOSIS — I10 ESSENTIAL HYPERTENSION: ICD-10-CM

## (undated) NOTE — LETTER
09/20/21        [facility]      Dear Robert Love,    [facility] records indicate that you have outstanding lab work and or testing that was ordered for you and has not yet been completed:  Orders Placed This Encounter      XR DEXA

## (undated) NOTE — LETTER
10/05/20        [address]      Dear Sugey Barone,    [address] records indicate that you have outstanding lab work and or testing that was ordered for you and has not yet been completed:  Orders Placed This Encounter      XR HIP +

## (undated) NOTE — LETTER
03/31/20        [facility]      Dear Miller Huertas,    [facility] records indicate that you have outstanding lab work and or testing that was ordered for you and has not yet been completed:  Orders Placed This Encounter      BETH GARCIA

## (undated) NOTE — LETTER
03/18/19        [address] [facility]      Dear Jf Bowers,    [address] records indicate that you have outstanding lab work and or testing that was ordered for you and has not yet been completed:  Orders Placed This Encounter      VICKY Blancas

## (undated) NOTE — LETTER
07/01/21        [facility]      Dear Marni Mcintyre,    [facility] records indicate that you have outstanding lab work and or testing that was ordered for you and has not yet been completed:  Orders Placed This Encounter      Lipid Pa

## (undated) NOTE — LETTER
03/18/19  [facility]      Dear Lyn Parents:    You had previously enrolled in our Chronic Care Management program and had been speaking with your care manager.  We have since made several attempts to reach you by phone over the

## (undated) NOTE — MR AVS SNAPSHOT
[address], [address] [place] [id number] [phone]               Thank you for choosing us for your health care visit with Kalyan Shore MD.  We are glad to serve you and happy to provide you with this TAKE 1 TABLET BY MOUTH ONCE DAILY. Commonly known as:  ZOCOR           SYNTHROID 125 MCG Tabs   Generic drug:  Levothyroxine Sodium   Take 125 mcg by mouth daily.                    Today's Orders     LIPID PANEL    Complete by:  Jun 07, 2017 (Approximate

## (undated) NOTE — LETTER
12/01/20        [address]      Dear Betty Manning,    [facility] records indicate that you have outstanding lab work and or testing that was ordered for you and has not yet been completed:  Orders Placed This Encounter      Hepatic

## (undated) NOTE — LETTER
05/08/20        [facility]      Dear Kianna Ahumada,    [facility] records indicate that you have outstanding lab work and or testing that was ordered for you and has not yet been completed.  Due to the current COVID-19 outbreak, no ac

## (undated) NOTE — LETTER
03/22/21        [address] [facility]      Dear Sugey Barone,    [facility] records indicate that you have outstanding lab work and or testing that was ordered for you and has not yet been completed:  Orders Placed This Encounter      Lipid Pa

## (undated) NOTE — LETTER
02/14/19    [address] [facility]      Dear Louise Boateng: It was a pleasure speaking with you over the phone recently.  To follow up, I wanted to send you my contact information to utilize when you have a question and or need some assista